# Patient Record
Sex: FEMALE | Race: WHITE | NOT HISPANIC OR LATINO | Employment: FULL TIME | URBAN - NONMETROPOLITAN AREA
[De-identification: names, ages, dates, MRNs, and addresses within clinical notes are randomized per-mention and may not be internally consistent; named-entity substitution may affect disease eponyms.]

---

## 2017-10-12 ENCOUNTER — TRANSCRIBE ORDERS (OUTPATIENT)
Dept: GENERAL RADIOLOGY | Facility: CLINIC | Age: 47
End: 2017-10-12

## 2017-10-12 DIAGNOSIS — Z12.39 SCREENING BREAST EXAMINATION: Primary | ICD-10-CM

## 2019-09-16 ENCOUNTER — TELEPHONE (OUTPATIENT)
Dept: ORTHOPEDIC SURGERY | Facility: CLINIC | Age: 49
End: 2019-09-16

## 2019-09-16 NOTE — TELEPHONE ENCOUNTER
PATIENT INJURED TOE YESTERDAY AND IS WORRIED ABOUT INFECTION. A CABLE WAS WRAPPED AROUND HER TOE AND HAS RIPPED THE NAIL OFF. SHE HAS HAD BOTH KNEES REPLACED WITHIN THE LAST THREE YEARS AND WANTED TO KNOW IF SHE NEEDED AN ANTIBIOTIC WITH THIS INJURY. PATIENT REQUESTED THAT SOMEONE SPEAK TO HER BEFORE CALLING IN A PRESCRIPTION. SHE CAN BE REACHED [phone].

## 2019-09-16 NOTE — TELEPHONE ENCOUNTER
I spoke with the patient and advised that she would need to speak with her PCP to be evaluated for the injury. She understood.    Gissel

## 2019-12-05 ENCOUNTER — OFFICE VISIT (OUTPATIENT)
Dept: FAMILY MEDICINE CLINIC | Facility: CLINIC | Age: 49
End: 2019-12-05

## 2019-12-05 ENCOUNTER — LAB (OUTPATIENT)
Dept: LAB | Facility: OTHER | Age: 49
End: 2019-12-05

## 2019-12-05 VITALS
HEART RATE: 69 BPM | TEMPERATURE: 98.9 F | HEIGHT: 65 IN | WEIGHT: 254.3 LBS | BODY MASS INDEX: 42.37 KG/M2 | SYSTOLIC BLOOD PRESSURE: 146 MMHG | DIASTOLIC BLOOD PRESSURE: 88 MMHG | RESPIRATION RATE: 16 BRPM | OXYGEN SATURATION: 99 %

## 2019-12-05 DIAGNOSIS — Z13.0 SCREENING, ANEMIA, DEFICIENCY, IRON: ICD-10-CM

## 2019-12-05 DIAGNOSIS — N76.0 BACTERIAL VAGINOSIS: Primary | ICD-10-CM

## 2019-12-05 DIAGNOSIS — N89.8 VAGINAL ITCHING: ICD-10-CM

## 2019-12-05 DIAGNOSIS — R60.9 PERIPHERAL EDEMA: ICD-10-CM

## 2019-12-05 DIAGNOSIS — I10 ESSENTIAL HYPERTENSION: ICD-10-CM

## 2019-12-05 DIAGNOSIS — B96.89 BACTERIAL VAGINOSIS: Primary | ICD-10-CM

## 2019-12-05 DIAGNOSIS — Z13.220 SCREENING, LIPID: ICD-10-CM

## 2019-12-05 DIAGNOSIS — G47.00 INSOMNIA, UNSPECIFIED TYPE: ICD-10-CM

## 2019-12-05 DIAGNOSIS — Z98.84 BARIATRIC SURGERY STATUS: ICD-10-CM

## 2019-12-05 DIAGNOSIS — Z23 NEED FOR INFLUENZA VACCINATION: ICD-10-CM

## 2019-12-05 DIAGNOSIS — N92.0 EXCESSIVE AND FREQUENT MENSTRUATION: ICD-10-CM

## 2019-12-05 DIAGNOSIS — Z13.1 SCREENING FOR DIABETES MELLITUS: ICD-10-CM

## 2019-12-05 LAB
ALBUMIN SERPL-MCNC: 4.3 G/DL (ref 3.5–5)
ALBUMIN/GLOB SERPL: 1.2 G/DL (ref 1.1–1.8)
ALP SERPL-CCNC: 76 U/L (ref 38–126)
ALT SERPL W P-5'-P-CCNC: 29 U/L
ANION GAP SERPL CALCULATED.3IONS-SCNC: 8 MMOL/L (ref 5–15)
AST SERPL-CCNC: 24 U/L (ref 14–36)
BACTERIA UR QL AUTO: ABNORMAL /HPF
BASOPHILS # BLD AUTO: 0.03 10*3/MM3 (ref 0–0.2)
BASOPHILS NFR BLD AUTO: 0.3 % (ref 0–1.5)
BILIRUB SERPL-MCNC: 0.3 MG/DL (ref 0.2–1.3)
BILIRUB UR QL STRIP: NEGATIVE
BUN BLD-MCNC: 20 MG/DL (ref 7–23)
BUN/CREAT SERPL: 29.4 (ref 7–25)
CALCIUM SPEC-SCNC: 9.7 MG/DL (ref 8.4–10.2)
CHLORIDE SERPL-SCNC: 107 MMOL/L (ref 101–112)
CHOLEST SERPL-MCNC: 243 MG/DL (ref 150–200)
CLARITY UR: CLEAR
CO2 SERPL-SCNC: 28 MMOL/L (ref 22–30)
COLOR UR: YELLOW
CREAT BLD-MCNC: 0.68 MG/DL (ref 0.52–1.04)
DEPRECATED RDW RBC AUTO: 45.7 FL (ref 37–54)
EOSINOPHIL # BLD AUTO: 0.37 10*3/MM3 (ref 0–0.4)
EOSINOPHIL NFR BLD AUTO: 4.2 % (ref 0.3–6.2)
ERYTHROCYTE [DISTWIDTH] IN BLOOD BY AUTOMATED COUNT: 13.6 % (ref 12.3–15.4)
GFR SERPL CREATININE-BSD FRML MDRD: 92 ML/MIN/1.73 (ref 58–135)
GLOBULIN UR ELPH-MCNC: 3.6 GM/DL (ref 2.3–3.5)
GLUCOSE BLD-MCNC: 82 MG/DL (ref 70–99)
GLUCOSE UR STRIP-MCNC: NEGATIVE MG/DL
HCT VFR BLD AUTO: 40.1 % (ref 34–46.6)
HDLC SERPL-MCNC: 59 MG/DL (ref 40–59)
HGB BLD-MCNC: 12.8 G/DL (ref 12–15.9)
HGB UR QL STRIP.AUTO: ABNORMAL
HYALINE CASTS UR QL AUTO: ABNORMAL /LPF
KETONES UR QL STRIP: NEGATIVE
LDLC SERPL CALC-MCNC: 163 MG/DL
LDLC/HDLC SERPL: 2.76 {RATIO} (ref 0–3.22)
LEUKOCYTE ESTERASE UR QL STRIP.AUTO: NEGATIVE
LYMPHOCYTES # BLD AUTO: 2.26 10*3/MM3 (ref 0.7–3.1)
LYMPHOCYTES NFR BLD AUTO: 25.9 % (ref 19.6–45.3)
MCH RBC QN AUTO: 30 PG (ref 26.6–33)
MCHC RBC AUTO-ENTMCNC: 31.9 G/DL (ref 31.5–35.7)
MCV RBC AUTO: 94.1 FL (ref 79–97)
MONOCYTES # BLD AUTO: 0.73 10*3/MM3 (ref 0.1–0.9)
MONOCYTES NFR BLD AUTO: 8.4 % (ref 5–12)
NEUTROPHILS # BLD AUTO: 5.34 10*3/MM3 (ref 1.7–7)
NEUTROPHILS NFR BLD AUTO: 61.2 % (ref 42.7–76)
NITRITE UR QL STRIP: NEGATIVE
PH UR STRIP.AUTO: 5.5 [PH] (ref 5.5–8)
PLATELET # BLD AUTO: 273 10*3/MM3 (ref 140–450)
PMV BLD AUTO: 11.4 FL (ref 6–12)
POTASSIUM BLD-SCNC: 3.8 MMOL/L (ref 3.4–5)
PROT SERPL-MCNC: 7.9 G/DL (ref 6.3–8.6)
PROT UR QL STRIP: NEGATIVE
RBC # BLD AUTO: 4.26 10*6/MM3 (ref 3.77–5.28)
RBC # UR: ABNORMAL /HPF
REF LAB TEST METHOD: ABNORMAL
SODIUM BLD-SCNC: 143 MMOL/L (ref 137–145)
SP GR UR STRIP: >=1.03 (ref 1–1.03)
SQUAMOUS #/AREA URNS HPF: ABNORMAL /HPF
TRIGL SERPL-MCNC: 107 MG/DL
UROBILINOGEN UR QL STRIP: ABNORMAL
VLDLC SERPL-MCNC: 21.4 MG/DL
WBC NRBC COR # BLD: 8.73 10*3/MM3 (ref 3.4–10.8)
WBC UR QL AUTO: ABNORMAL /HPF

## 2019-12-05 PROCEDURE — 82728 ASSAY OF FERRITIN: CPT | Performed by: NURSE PRACTITIONER

## 2019-12-05 PROCEDURE — 87086 URINE CULTURE/COLONY COUNT: CPT | Performed by: NURSE PRACTITIONER

## 2019-12-05 PROCEDURE — 84439 ASSAY OF FREE THYROXINE: CPT | Performed by: NURSE PRACTITIONER

## 2019-12-05 PROCEDURE — 82306 VITAMIN D 25 HYDROXY: CPT | Performed by: NURSE PRACTITIONER

## 2019-12-05 PROCEDURE — 84466 ASSAY OF TRANSFERRIN: CPT | Performed by: NURSE PRACTITIONER

## 2019-12-05 PROCEDURE — 84480 ASSAY TRIIODOTHYRONINE (T3): CPT | Performed by: NURSE PRACTITIONER

## 2019-12-05 PROCEDURE — 99214 OFFICE O/P EST MOD 30 MIN: CPT | Performed by: NURSE PRACTITIONER

## 2019-12-05 PROCEDURE — 90471 IMMUNIZATION ADMIN: CPT | Performed by: NURSE PRACTITIONER

## 2019-12-05 PROCEDURE — 80053 COMPREHEN METABOLIC PANEL: CPT | Performed by: NURSE PRACTITIONER

## 2019-12-05 PROCEDURE — 36415 COLL VENOUS BLD VENIPUNCTURE: CPT | Performed by: NURSE PRACTITIONER

## 2019-12-05 PROCEDURE — 85025 COMPLETE CBC W/AUTO DIFF WBC: CPT | Performed by: NURSE PRACTITIONER

## 2019-12-05 PROCEDURE — 90674 CCIIV4 VAC NO PRSV 0.5 ML IM: CPT | Performed by: NURSE PRACTITIONER

## 2019-12-05 PROCEDURE — 84443 ASSAY THYROID STIM HORMONE: CPT | Performed by: NURSE PRACTITIONER

## 2019-12-05 PROCEDURE — 83036 HEMOGLOBIN GLYCOSYLATED A1C: CPT | Performed by: NURSE PRACTITIONER

## 2019-12-05 PROCEDURE — 83527 ASSAY OF INSULIN: CPT | Performed by: NURSE PRACTITIONER

## 2019-12-05 PROCEDURE — 81001 URINALYSIS AUTO W/SCOPE: CPT | Performed by: NURSE PRACTITIONER

## 2019-12-05 PROCEDURE — 83540 ASSAY OF IRON: CPT | Performed by: NURSE PRACTITIONER

## 2019-12-05 PROCEDURE — 83525 ASSAY OF INSULIN: CPT | Performed by: NURSE PRACTITIONER

## 2019-12-05 PROCEDURE — 80061 LIPID PANEL: CPT | Performed by: NURSE PRACTITIONER

## 2019-12-05 PROCEDURE — 82607 VITAMIN B-12: CPT | Performed by: NURSE PRACTITIONER

## 2019-12-05 PROCEDURE — 82746 ASSAY OF FOLIC ACID SERUM: CPT | Performed by: NURSE PRACTITIONER

## 2019-12-05 RX ORDER — HYDROCHLOROTHIAZIDE 25 MG/1
25 TABLET ORAL DAILY
Qty: 90 TABLET | Refills: 3 | Status: SHIPPED | OUTPATIENT
Start: 2019-12-05 | End: 2020-12-22

## 2019-12-05 RX ORDER — METRONIDAZOLE 500 MG/1
500 TABLET ORAL 3 TIMES DAILY
Qty: 21 TABLET | Refills: 0 | Status: SHIPPED | OUTPATIENT
Start: 2019-12-05 | End: 2019-12-12

## 2019-12-05 RX ORDER — DOXEPIN HYDROCHLORIDE 100 MG/1
100 CAPSULE ORAL NIGHTLY
Qty: 30 CAPSULE | Refills: 3 | Status: SHIPPED | OUTPATIENT
Start: 2019-12-05 | End: 2019-12-09 | Stop reason: DRUGHIGH

## 2019-12-05 RX ORDER — LISINOPRIL 10 MG/1
10 TABLET ORAL DAILY
Qty: 30 TABLET | Refills: 5 | Status: SHIPPED | OUTPATIENT
Start: 2019-12-05 | End: 2019-12-13

## 2019-12-05 NOTE — PROGRESS NOTES
Chief Complaint   Patient presents with   • Establish Care     high bp   • Insomnia   • Vaginal Itching     Subjective   Fidelina Dailey is a 48 y.o. female who presents to the office for hypertension, insomnia, and vaginal itching. Desires to establish care, previous patient of EDITH Gupta, was referred here by her family members who are patients in our office.     The following portions of the patient's history were reviewed and updated as appropriate: allergies, current medications, past family history, past medical history, past social history, past surgical history and problem list.    History of Present Illness   Insomnia: difficulty staying asleep. daysleeper due to night shift work. Reports needs about 8 hours to wake feeling rested and only is able to get about 5 hours per day since going on night shift in July.   Failed OTC sleep aids, otherwise treatment naive.    Swelling BLE x 3 months or so. Left lower leg much more swollen than right. Is not painful. No history of trauma or surgery of legs.    HTN: reports at least for 6 months has been told when she was in ER or at an office visit that she had elevated blood pressure.   Denies history of heart attack or stroke.   Reports no known family history of early ischemic heart disease.   Treatment naive for HTN.     Hasnt had any labs drawn for over 6 months, not aware of any abnormals except cholesterol, which was treated with statin, but stopped due to lack of follow up due to work and failure to schedule.    Gastric sleeve surgery status as of 3 years ago. Calcium supplementation was advised by Dr weeks along with vitamin D, B12 and multivitamin. Has never been advised of any vitamin deficiency. She reports compliance with OTC vitamin therapy.     Vaginal itch onset several months ago. without noted discharge, denies dyspareunia, no urinary frequency, urgency or dysuria.   Has tried OTC monistat repeatedly with refractory recurrence of  symptom.    Menstrual abnormality: previously experience less frequent periods than monthly and was placed on hormonal therapy to fix it. After she stopped taking this, she has subsequently began to have more frequent periods than monthly, sometimes and most of the time, actually, having 2 menstrual cycles per month.   Discussed BV as source of her recurrent vaginal itch versus condition caused by hormonal imbalance or personal development of diabetes as possible causes.  Agreeable to reschedule with Dr Rodríguez, her OB/GYN physician to discuss this.  Agreeable to a trial of metronidazole and a urinalysis/ culture to rule out silent UTI as a cause as well.   Will also check for anemia/ iron def due to excessive menstrual bleeding.           Past Medical History:   Diagnosis Date   • Hyperlipidemia    • Hypertension           Family History   Problem Relation Age of Onset   • Hypertension Mother    • Cancer Mother    • No Known Problems Father         Review of Systems   Constitutional: Positive for fatigue (chronically).   HENT: Negative.    Eyes: Negative.    Respiratory: Negative.    Cardiovascular: Positive for leg swelling (LLE chronic). Negative for chest pain and palpitations.        HTN for months.   Gastrointestinal: Positive for diarrhea.        Some diarrhea dependent on diet since gallbladder removed in 2018   Endocrine: Negative.    Genitourinary: Negative for decreased urine volume, dysuria, frequency, pelvic pain, urgency, vaginal bleeding, vaginal discharge (itchy, no discharge) and vaginal pain.   Allergic/Immunologic: Negative.    Neurological: Negative.    Hematological: Negative.    Psychiatric/Behavioral: Positive for sleep disturbance. Negative for suicidal ideas.        Daysleeper due to night shift work. Difficulty staying asleep, no trouble falling asleep.   All other systems reviewed and are negative.      Objective   Vitals:    12/05/19 0940   BP: 146/88   BP Location: Left arm   Patient  "Position: Sitting   Cuff Size: Adult   Pulse: 69   Resp: 16   Temp: 98.9 °F (37.2 °C)   TempSrc: Tympanic   SpO2: 99%   Weight: 115 kg (254 lb 4.8 oz)   Height: 165.1 cm (65\")   PainSc: 0-No pain     Physical Exam   Constitutional: She is oriented to person, place, and time. She appears well-developed and well-nourished.   obesity   HENT:   Head: Normocephalic and atraumatic.   Eyes: Conjunctivae are normal. Pupils are equal, round, and reactive to light.   Neck: Normal range of motion. Neck supple. No JVD present. No tracheal deviation present. No thyromegaly present.   Cardiovascular: Normal rate, regular rhythm, normal heart sounds and intact distal pulses. Exam reveals no gallop and no friction rub.   No murmur heard.  Pulmonary/Chest: Effort normal and breath sounds normal. No respiratory distress. She has no wheezes. She has no rales. She exhibits no tenderness.   Abdominal: Soft. Bowel sounds are normal. She exhibits no distension and no mass. There is no tenderness. There is no rebound and no guarding. No hernia.   Musculoskeletal: Normal range of motion. She exhibits no edema, tenderness or deformity.   Lymphadenopathy:     She has no cervical adenopathy.   Neurological: She is alert and oriented to person, place, and time. No cranial nerve deficit.   Skin: Skin is warm and dry. Capillary refill takes 2 to 3 seconds. No erythema. No pallor.   Psychiatric: She has a normal mood and affect. Her behavior is normal. Judgment and thought content normal.   Nursing note and vitals reviewed.      Assessment/Plan   Fidelina was seen today for establish care, insomnia and vaginal itching.    Diagnoses and all orders for this visit:    Bacterial vaginosis  -     metroNIDAZOLE (FLAGYL) 500 MG tablet; Take 1 tablet by mouth 3 (Three) Times a Day for 7 days.    Insomnia, unspecified type  -     doxepin (SINEquan) 100 MG capsule; Take 1 capsule by mouth Every Night.    Peripheral edema  -     hydroCHLOROthiazide " (HYDRODIURIL) 25 MG tablet; Take 1 tablet by mouth Daily.    Essential hypertension  -     lisinopril (PRINIVIL,ZESTRIL) 10 MG tablet; Take 1 tablet by mouth Daily.  -     CBC & Differential  -     Comprehensive Metabolic Panel  -     T4, Free  -     TSH  -     T3    Screening, lipid  -     Lipid Panel    Screening for diabetes mellitus  -     Comprehensive Metabolic Panel  -     Hemoglobin A1c  -     Insulin, Free & Total, Serum    Screening, anemia, deficiency, iron  -     CBC & Differential  -     Ferritin  -     Iron Profile    Bariatric surgery status  -     Comprehensive Metabolic Panel  -     Vitamin B12 & Folate  -     Vitamin D 25 Hydroxy    Vaginal itching  -     Urinalysis With Microscopic - Urine, Clean Catch; Future  -     Urine Culture - Urine, Urine, Clean Catch; Future  -     Ambulatory Referral to Obstetrics / Gynecology    Excessive and frequent menstruation  -     Ambulatory Referral to Obstetrics / Gynecology           PHQ-2/PHQ-9 Depression Screening 12/5/2019   Little interest or pleasure in doing things 0   Feeling down, depressed, or hopeless 0   Trouble falling or staying asleep, or sleeping too much 3   Feeling tired or having little energy 3   Poor appetite or overeating 0   Feeling bad about yourself - or that you are a failure or have let yourself or your family down 0   Trouble concentrating on things, such as reading the newspaper or watching television 0   Moving or speaking so slowly that other people could have noticed. Or the opposite - being so fidgety or restless that you have been moving around a lot more than usual 0   Thoughts that you would be better off dead, or of hurting yourself in some way 0   Total Score 6       EDITH Graham         Return in about 1 week (around 12/12/2019).    Patient Instructions   Have labs done today.

## 2019-12-06 LAB
25(OH)D3 SERPL-MCNC: 41.1 NG/ML (ref 30–100)
BACTERIA SPEC AEROBE CULT: NORMAL
FERRITIN SERPL-MCNC: 53.1 NG/ML (ref 13–150)
FOLATE SERPL-MCNC: 16.8 NG/ML (ref 4.78–24.2)
HBA1C MFR BLD: 5.1 % (ref 4.8–5.6)
IRON 24H UR-MRATE: 41 MCG/DL (ref 37–145)
IRON SATN MFR SERPL: 10 % (ref 20–50)
T3 SERPL-MCNC: 108 NG/DL (ref 80–200)
T4 FREE SERPL-MCNC: 1.18 NG/DL (ref 0.93–1.7)
TIBC SERPL-MCNC: 402 MCG/DL (ref 298–536)
TRANSFERRIN SERPL-MCNC: 270 MG/DL (ref 200–360)
TSH SERPL DL<=0.05 MIU/L-ACNC: 2.82 UIU/ML (ref 0.27–4.2)
VIT B12 BLD-MCNC: 1122 PG/ML (ref 211–946)

## 2019-12-09 DIAGNOSIS — G47.00 INSOMNIA, UNSPECIFIED TYPE: Primary | ICD-10-CM

## 2019-12-09 RX ORDER — DOXEPIN HYDROCHLORIDE 25 MG/1
CAPSULE ORAL
Qty: 60 CAPSULE | Refills: 5 | Status: SHIPPED | OUTPATIENT
Start: 2019-12-09 | End: 2019-12-13

## 2019-12-11 LAB
INSULIN FREE SERPL-ACNC: 6.9 UU/ML
INSULIN SERPL-ACNC: 6.9 UU/ML

## 2019-12-13 ENCOUNTER — LAB (OUTPATIENT)
Dept: LAB | Facility: OTHER | Age: 49
End: 2019-12-13

## 2019-12-13 ENCOUNTER — OFFICE VISIT (OUTPATIENT)
Dept: FAMILY MEDICINE CLINIC | Facility: CLINIC | Age: 49
End: 2019-12-13

## 2019-12-13 VITALS
RESPIRATION RATE: 16 BRPM | OXYGEN SATURATION: 99 % | HEIGHT: 65 IN | DIASTOLIC BLOOD PRESSURE: 87 MMHG | TEMPERATURE: 98.9 F | HEART RATE: 71 BPM | WEIGHT: 248.7 LBS | BODY MASS INDEX: 41.44 KG/M2 | SYSTOLIC BLOOD PRESSURE: 144 MMHG

## 2019-12-13 DIAGNOSIS — Z13.1 SCREENING FOR DIABETES MELLITUS: ICD-10-CM

## 2019-12-13 DIAGNOSIS — E78.2 MIXED HYPERLIPIDEMIA: Primary | Chronic | ICD-10-CM

## 2019-12-13 DIAGNOSIS — Z98.84 BARIATRIC SURGERY STATUS: ICD-10-CM

## 2019-12-13 DIAGNOSIS — E66.9 ABDOMINAL OBESITY AND METABOLIC SYNDROME: ICD-10-CM

## 2019-12-13 DIAGNOSIS — E88.81 ABDOMINAL OBESITY AND METABOLIC SYNDROME: ICD-10-CM

## 2019-12-13 DIAGNOSIS — I10 ESSENTIAL HYPERTENSION: Chronic | ICD-10-CM

## 2019-12-13 DIAGNOSIS — G47.00 INSOMNIA, UNSPECIFIED TYPE: ICD-10-CM

## 2019-12-13 DIAGNOSIS — Z79.899 LONG-TERM USE OF HIGH-RISK MEDICATION: ICD-10-CM

## 2019-12-13 DIAGNOSIS — Z13.220 SCREENING, LIPID: ICD-10-CM

## 2019-12-13 DIAGNOSIS — R60.9 PERIPHERAL EDEMA: ICD-10-CM

## 2019-12-13 PROBLEM — E78.5 HYPERLIPIDEMIA: Chronic | Status: ACTIVE | Noted: 2019-12-13

## 2019-12-13 PROCEDURE — G0481 DRUG TEST DEF 8-14 CLASSES: HCPCS | Performed by: NURSE PRACTITIONER

## 2019-12-13 PROCEDURE — 99213 OFFICE O/P EST LOW 20 MIN: CPT | Performed by: NURSE PRACTITIONER

## 2019-12-13 PROCEDURE — 80307 DRUG TEST PRSMV CHEM ANLYZR: CPT | Performed by: NURSE PRACTITIONER

## 2019-12-13 RX ORDER — PRAVASTATIN SODIUM 20 MG
20 TABLET ORAL DAILY
Qty: 30 TABLET | Refills: 5 | Status: SHIPPED | OUTPATIENT
Start: 2019-12-13 | End: 2020-03-09 | Stop reason: DRUGHIGH

## 2019-12-13 RX ORDER — PRAVASTATIN SODIUM 10 MG
10 TABLET ORAL NIGHTLY
Qty: 30 TABLET | Refills: 5 | Status: CANCELLED | OUTPATIENT
Start: 2019-12-13

## 2019-12-13 RX ORDER — LISINOPRIL 20 MG/1
20 TABLET ORAL DAILY
Qty: 30 TABLET | Refills: 5 | Status: SHIPPED | OUTPATIENT
Start: 2019-12-13 | End: 2020-05-05

## 2019-12-13 RX ORDER — ZALEPLON 10 MG/1
10 CAPSULE ORAL NIGHTLY
Qty: 30 CAPSULE | Refills: 0 | Status: SHIPPED | OUTPATIENT
Start: 2019-12-13 | End: 2019-12-26 | Stop reason: SDUPTHER

## 2019-12-13 NOTE — PATIENT INSTRUCTIONS
"Remember to take your medications in \"your morning\" so before you go to work   Take your cholesterol medication with your sleeping pill before you go to sleep(:     Follow up appointment in 3 months, get lab work done a few days before you arrive      "

## 2019-12-13 NOTE — PROGRESS NOTES
Chief Complaint   Patient presents with   • Hypertension     1 week f/u   • Anxiety     Subjective   Fidelina Dailey is a 48 y.o. female who presents to the office for routine follow up of chronic illnesses and review of lab work after establishing care last week.    The following portions of the patient's history were reviewed and updated as appropriate: allergies, current medications, past family history, past medical history, past social history, past surgical history and problem list.    History of Present Illness   Fasting labs: 12/5/19  CBC: WNL  CMP: WNL, BUN/Creat ratio 29.4  Lipid: ,   Thyroid: WNL  A1c: 5.1%  B12: WNL, >1000  Vitamin D: WNL  Insulin, free: 6.9    Insomnia: Reports she is unable to take the Doxepin because it makes her too sleepy. She reports that she slept over 12 hours, then after getting up, she went back to sleep several times. She reports having tired her SO's sonata with great benefit and no drowsiness.      Swelling BLE : Started HCTZ one week ago and reports that she has seen little improvement. Left leg remains worse than right with 4+ pitting edema. She is also taking this medication when she gets home from work in the mornings before going to sleep.      HTN: Started on lisinopril last week for HTN BP was 157/98. Today she is 144/87.  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 She is advised to take the medication before going to work since that is her morning.      Vaginal itch: She reports that she is still itching after flagyl prescribed last week. The odor has improved as well but neither are gone. She had an appointment this morning with Dr. Escoto but had to cancel due to work. She plans to call and reschedule for sometime this week.      Hyperlipidemia: Has previously had high cholesterol in the past and took pravastatin in the past but was unable to follow up with the doctor  "so she stopped the medication. Restarted on medication at this time and educated on the importance of follow up and continuation of care.     Portions of HPI, ROS  and PE from most recent  Visit carried forward and updated as appropriate for current situation.  Past Medical History:   Diagnosis Date   • Hyperlipidemia    • Hypertension           Family History   Problem Relation Age of Onset   • Hypertension Mother    • Cancer Mother    • No Known Problems Father         Review of Systems   Constitutional: Positive for activity change and fatigue (chronically).   HENT: Negative.    Eyes: Negative.    Respiratory: Negative.    Cardiovascular: Positive for leg swelling (LLE chronic ). Negative for chest pain and palpitations.        HTN for months.   Gastrointestinal: Positive for diarrhea.        Chronic due gastric surgery     Endocrine: Negative.    Genitourinary: Negative for decreased urine volume, dysuria, frequency, pelvic pain, urgency, vaginal bleeding, vaginal discharge (itchy, no discharge) and vaginal pain.   Allergic/Immunologic: Negative.    Neurological: Negative.    Hematological: Negative.    Psychiatric/Behavioral: Positive for sleep disturbance. Negative for confusion and suicidal ideas. The patient is not nervous/anxious.         Daysleeper due to night shift work. Difficulty staying asleep, no trouble falling asleep.   All other systems reviewed and are negative.      Objective   Vitals:    12/13/19 1005   BP: 144/87   BP Location: Left arm   Patient Position: Sitting   Cuff Size: Adult   Pulse: 71   Resp: 16   Temp: 98.9 °F (37.2 °C)   TempSrc: Tympanic   SpO2: 99%   Weight: 113 kg (248 lb 11.2 oz)   Height: 165.1 cm (65\")   PainSc: 0-No pain     Physical Exam   Constitutional: She is oriented to person, place, and time. She appears well-developed and well-nourished.   obesity   HENT:   Head: Normocephalic and atraumatic.   Eyes: Pupils are equal, round, and reactive to light. Conjunctivae are " normal.   Neck: Normal range of motion. Neck supple. No JVD present. No tracheal deviation present. No thyromegaly present.   Cardiovascular: Normal rate, regular rhythm, normal heart sounds and intact distal pulses. Exam reveals no gallop and no friction rub.   No murmur heard.  Pulmonary/Chest: Effort normal and breath sounds normal. No respiratory distress. She has no wheezes. She has no rales. She exhibits no tenderness.   Abdominal: Soft. Bowel sounds are normal. She exhibits no distension and no mass. There is no tenderness. There is no rebound and no guarding. No hernia.   Musculoskeletal: Normal range of motion. She exhibits no edema, tenderness or deformity.   Lymphadenopathy:     She has no cervical adenopathy.   Neurological: She is alert and oriented to person, place, and time. No cranial nerve deficit.   Skin: Skin is warm and dry. Capillary refill takes 2 to 3 seconds. No erythema. No pallor.   Psychiatric: She has a normal mood and affect. Her behavior is normal. Judgment and thought content normal.   Nursing note and vitals reviewed.      Assessment/Plan   Fidelina was seen today for hypertension and anxiety.    Diagnoses and all orders for this visit:    Mixed hyperlipidemia  -     pravastatin (PRAVACHOL) 20 MG tablet; Take 1 tablet by mouth Daily.  -     Comprehensive Metabolic Panel; Future  -     Lipid Panel; Future    Essential hypertension  -     lisinopril (PRINIVIL,ZESTRIL) 20 MG tablet; Take 1 tablet by mouth Daily.    Peripheral edema  -     Ambulatory Referral to Cardiothoracic Surgery    Screening, lipid    Bariatric surgery status  -     Comprehensive Metabolic Panel; Future  -     Lipid Panel; Future  -     CBC & Differential; Future    Insomnia, unspecified type  -     zaleplon (SONATA) 10 MG capsule; Take 1 capsule by mouth Every Night.  -     CBC & Differential; Future  -     T4, Free; Future  -     TSH; Future  -     T3; Future    Screening for diabetes mellitus  -     Hemoglobin A1c;  "Future    Abdominal obesity and metabolic syndrome  -     Comprehensive Metabolic Panel; Future    Long-term use of high-risk medication  -     ToxASSURE Select 13 Discrete -; Future           PHQ-2/PHQ-9 Depression Screening 12/5/2019   Little interest or pleasure in doing things 0   Feeling down, depressed, or hopeless 0   Trouble falling or staying asleep, or sleeping too much 3   Feeling tired or having little energy 3   Poor appetite or overeating 0   Feeling bad about yourself - or that you are a failure or have let yourself or your family down 0   Trouble concentrating on things, such as reading the newspaper or watching television 0   Moving or speaking so slowly that other people could have noticed. Or the opposite - being so fidgety or restless that you have been moving around a lot more than usual 0   Thoughts that you would be better off dead, or of hurting yourself in some way 0   Total Score 6   Patient understands the risks associated with this controlled medication, including tolerance and addiction.  Patient also agrees to only obtain this medication from me, and not from a another provider, unless that provider is covering for me in my absence.  Patient also agrees to be compliant in dosing, and not self adjust the dose of medication.  A signed controlled substance agreement is on file, and the patient has received a controlled substance education sheet at this a previous visit.  The patient has also signed a consent for treatment with a controlled substance as per [facility] policy. MIL was obtained.      EDITH Graham         Return in about 1 month (around 1/13/2020).    Patient Instructions   Remember to take your medications in \"your morning\" so before you go to work   Take your cholesterol medication with your sleeping pill before you go to sleep(:     Follow up appointment in 3 months, get lab work done a few days before you arrive        "

## 2019-12-20 LAB
6-ACETYLMORPHINE: NEGATIVE
6MAM SERPLBLD-MCNC: NOT DETECTED NG/MG CREAT
7-AMINOCLONAZEPAM UR: NOT DETECTED NG/MG CREAT
A-OH ALPRAZ/CREAT UR: NOT DETECTED NG/MG CREAT
ALFENTANIL UR QL: NOT DETECTED NG/MG CREAT
ALPHA-HYDROXYMIDAZOLAM, URINE: NOT DETECTED NG/MG CREAT
ALPHA-HYDROXYTRIAZOLAM, URINE: NOT DETECTED NG/MG CREAT
AMOBARBITAL UR: NOT DETECTED
AMPHET UR QL CFM: NOT DETECTED NG/MG CREAT
AMPHETAMINES UR QL SCN: NEGATIVE
BARBITAL UR QL CFM: NOT DETECTED
BARBITURATES UR QL SCN: NEGATIVE
BENZODIAZ UR QL SCN: NEGATIVE
BENZOYLECGONINE UR: NOT DETECTED NG/MG CREAT
BUPRENORPHINE UR QL: NEGATIVE
BUPRENORPHINE UR QL: NOT DETECTED NG/MG CREAT
BUTABARBITAL UR QL: NOT DETECTED
BUTALBITAL UR QL: NOT DETECTED
CANNABINOIDS UR QL CFM: NEGATIVE
CLONAZEPAM UR QL: NOT DETECTED NG/MG CREAT
COCAETHYLENE UR QL CFM: NOT DETECTED NG/MG CREAT
COCAINE UR QL CFM: NEGATIVE
CODEINE UR QL: NOT DETECTED NG/MG CREAT
CONV COCAINE, UR: NOT DETECTED NG/MG CREAT
CONV REPORT SUMMARY: NORMAL
CREAT 24H UR-MCNC: 77 MG/DL
DESALKYLFLURAZ/CREAT UR: NOT DETECTED NG/MG CREAT
DESMETHYLFLUNITRAZEPAM: NOT DETECTED NG/MG CREAT
DIAZEPAM UR-MCNC: NOT DETECTED NG/MG CREAT
DIHYDROCODEINE UR: NOT DETECTED NG/MG CREAT
EDDP SERPL QL: NOT DETECTED NG/MG CREAT
ETHANOL SCREEN URINE (REF): NEGATIVE
ETHANOL UR-MCNC: NOT DETECTED G/DL
FENTANYL UR QL: NEGATIVE
FENTANYL+NORFENTANYL UR QL SCN: NOT DETECTED NG/MG CREAT
FLUNITRAZEPAM SERPLBLD-MCNC: NOT DETECTED NG/MG CREAT
HYDROCODONE UR QL: NOT DETECTED NG/MG CREAT
HYDROMORPHONE UR QL: NOT DETECTED NG/MG CREAT
LEVEL OF DETECTION:: NORMAL
LORAZEPAM UR-MCNC: NOT DETECTED NG/MG CREAT
LORAZEPAM/CREAT UR: NOT DETECTED NG/MG CREAT
MDA SERPLBLD-MCNC: NOT DETECTED NG/MG CREAT
MDMA UR QL SCN: NOT DETECTED NG/MG CREAT
MEPHOBARBITAL UR QL CFM: NOT DETECTED
METHADONE BLD QL SCN: NEGATIVE
METHADONE, URINE: NOT DETECTED NG/MG CREAT
METHAMPHETAMINE UR: NOT DETECTED NG/MG CREAT
MIDAZOLAM UR-MCNC: NOT DETECTED NG/MG CREAT
MORPHINE UR QL: NOT DETECTED NG/MG CREAT
N-DESMETHYLTRAMADOL, U: NOT DETECTED NG/MG CREAT
NARCOTICS UR: NEGATIVE
NORBUPRENORPHINE SERPLBLD-MCNC: NOT DETECTED NG/MG CREAT
NORCODEINE UR-MCNC: NOT DETECTED NG/MG CREAT
NORDIAZEPAM SERPL CFM-MCNC: NOT DETECTED NG/MG CREAT
NORFENTANYL UR: NOT DETECTED NG/MG CREAT
NORMORPHINE: NOT DETECTED NG/MG CREAT
NOROXYMORPHONE: NOT DETECTED NG/MG CREAT
O-DESMETHYLTRAMADOL, UR: NOT DETECTED NG/MG CREAT
OPIATES UR QL CFM: NEGATIVE
OPIATES, URINE, NORHYDROCODONE: NOT DETECTED NG/MG CREAT
OPIATES, URINE, NOROXYCODONE: NOT DETECTED NG/MG CREAT
OXAZEPAM UR QL: NOT DETECTED NG/MG CREAT
OXYCODONE UR QL: NEGATIVE
OXYCODONE UR: NOT DETECTED NG/MG CREAT
OXYMORPHONE UR: NOT DETECTED NG/MG CREAT
PENTOBARBITAL UR: NOT DETECTED
PHENOBARB UR QL: NOT DETECTED
SECOBARBITAL UR QL: NOT DETECTED
SUFENTANIL UR QL: NOT DETECTED NG/MG CREAT
TAPENTADOL SERPLBLD-MCNC: NEGATIVE NG/ML
TAPENTADOL UR-MCNC: NOT DETECTED NG/MG CREAT
TEMAZEPAM UR QL CFM: NOT DETECTED NG/MG CREAT
THC UR CFM-MCNC: NOT DETECTED NG/MG CREAT
THIOPENTAL UR QL CFM: NOT DETECTED
TRAMADOL UR: NOT DETECTED NG/MG CREAT

## 2019-12-26 ENCOUNTER — OFFICE VISIT (OUTPATIENT)
Dept: FAMILY MEDICINE CLINIC | Facility: CLINIC | Age: 49
End: 2019-12-26

## 2019-12-26 VITALS
HEIGHT: 65 IN | HEART RATE: 82 BPM | TEMPERATURE: 97.1 F | BODY MASS INDEX: 40.24 KG/M2 | RESPIRATION RATE: 16 BRPM | DIASTOLIC BLOOD PRESSURE: 80 MMHG | OXYGEN SATURATION: 98 % | WEIGHT: 241.5 LBS | SYSTOLIC BLOOD PRESSURE: 132 MMHG

## 2019-12-26 DIAGNOSIS — G47.00 INSOMNIA, UNSPECIFIED TYPE: ICD-10-CM

## 2019-12-26 DIAGNOSIS — N76.0 BACTERIAL VAGINOSIS: ICD-10-CM

## 2019-12-26 DIAGNOSIS — E61.1 IRON DEFICIENCY: Primary | ICD-10-CM

## 2019-12-26 DIAGNOSIS — I10 ESSENTIAL HYPERTENSION: Chronic | ICD-10-CM

## 2019-12-26 DIAGNOSIS — B96.89 BACTERIAL VAGINOSIS: ICD-10-CM

## 2019-12-26 PROCEDURE — 99214 OFFICE O/P EST MOD 30 MIN: CPT | Performed by: NURSE PRACTITIONER

## 2019-12-26 RX ORDER — ZALEPLON 10 MG/1
10 CAPSULE ORAL NIGHTLY
Qty: 30 CAPSULE | Refills: 0 | Status: SHIPPED | OUTPATIENT
Start: 2019-12-26 | End: 2020-01-23 | Stop reason: ALTCHOICE

## 2019-12-26 RX ORDER — METRONIDAZOLE 500 MG/1
500 TABLET ORAL 3 TIMES DAILY
Qty: 30 TABLET | Refills: 0 | Status: SHIPPED | OUTPATIENT
Start: 2019-12-26 | End: 2020-01-05

## 2019-12-26 RX ORDER — LANOLIN ALCOHOL/MO/W.PET/CERES
325 CREAM (GRAM) TOPICAL
Qty: 90 TABLET | Refills: 0 | Status: SHIPPED | OUTPATIENT
Start: 2019-12-26 | End: 2020-06-18

## 2019-12-26 RX ORDER — CLOTRIMAZOLE 1 %
CREAM (GRAM) TOPICAL 2 TIMES DAILY
Qty: 60 G | Refills: 0 | Status: SHIPPED | OUTPATIENT
Start: 2019-12-26 | End: 2020-06-18

## 2019-12-26 NOTE — PROGRESS NOTES
Chief Complaint   Patient presents with   • Lab Results     Subjective   Fidelina Dailey is a 48 y.o. female who presents to the office for routine follow up of chronic illnesses. She is also here to discuss lab results    The following portions of the patient's history were reviewed and updated as appropriate: allergies, current medications, past family history, past medical history, past social history, past surgical history and problem list.    History of Present Illness   UDS: 12/13/19 initial UDS appropriate    Fasting labs: 12/5/19  CBC: WNL  CMP: WNL, BUN/Creat ratio 29.4  Lipid: ,   Thyroid: WNL  A1c: 5.1%  B12: WNL, >1000  Vitamin D: WNL  Insulin, free: 6.9    Iron Deficiency: Her recent labs show iron stores low; patent reports difficulty sleeping, fatigue, and anxiety. Her CBC was WNL. Will treat with iron replacement.     Insomnia: Started on Sonata 10mg nightly at last visit. She reports that she has not started taking this medication. The pharmacy reportedly sent a PA to our office, however, there is not one available. I will resend the Rx today and the patient is aware of the transaction and will alert the office if they need to send another PA when she gets to the pharmacy.     HTN: BP well controlled today at 132/80. She reports she is now taking her BP medications before work and feels like it is better controlled since she is taking it before work.     BV: This is a continual problem previously treated with flagyl. She reports that this helped and would like to try it again since she has been unable to see Almita Bates yet.     Portions of ROS  and PE from most recent  Visit carried forward and updated as appropriate for current situation.  Past Medical History:   Diagnosis Date   • Hyperlipidemia    • Hypertension           Family History   Problem Relation Age of Onset   • Hypertension Mother    • Cancer Mother    • No Known Problems Father         Review of Systems  "  Constitutional: Positive for fatigue (chronically). Negative for activity change.   HENT: Negative.    Eyes: Negative.    Respiratory: Negative.    Cardiovascular: Positive for leg swelling (LLE chronic ). Negative for chest pain and palpitations.   Gastrointestinal: Positive for diarrhea. Negative for abdominal distention, abdominal pain, nausea and vomiting.        Chronic due gastric surgery     Endocrine: Negative.    Genitourinary: Negative for decreased urine volume, dysuria, frequency, pelvic pain, urgency, vaginal bleeding, vaginal discharge (itchy, no discharge) and vaginal pain.        Vaginal itching continues   Allergic/Immunologic: Negative.    Neurological: Negative.    Hematological: Negative.    Psychiatric/Behavioral: Positive for sleep disturbance. Negative for confusion and suicidal ideas. The patient is not nervous/anxious.         Daysleeper due to night shift work. Difficulty staying asleep, no trouble falling asleep.   All other systems reviewed and are negative.      Objective   Vitals:    12/26/19 0856   BP: 132/80   BP Location: Left arm   Patient Position: Sitting   Cuff Size: Adult   Pulse: 82   Resp: 16   Temp: 97.1 °F (36.2 °C)   TempSrc: Tympanic   SpO2: 98%   Weight: 110 kg (241 lb 8 oz)   Height: 165.1 cm (65\")     Physical Exam   Constitutional: She is oriented to person, place, and time. She appears well-developed and well-nourished.   obesity   HENT:   Head: Normocephalic and atraumatic.   Eyes: Pupils are equal, round, and reactive to light. Conjunctivae are normal.   Neck: Normal range of motion. Neck supple. No JVD present. No tracheal deviation present. No thyromegaly present.   Cardiovascular: Normal rate, regular rhythm, normal heart sounds and intact distal pulses. Exam reveals no gallop and no friction rub.   No murmur heard.  Pulmonary/Chest: Effort normal and breath sounds normal. No respiratory distress. She has no wheezes. She has no rales. She exhibits no " tenderness.   Abdominal: Soft. Bowel sounds are normal. She exhibits no distension and no mass. There is no tenderness. There is no rebound and no guarding. No hernia.   Musculoskeletal: Normal range of motion. She exhibits no edema, tenderness or deformity.   Lymphadenopathy:     She has no cervical adenopathy.   Neurological: She is alert and oriented to person, place, and time. No cranial nerve deficit.   Skin: Skin is warm and dry. Capillary refill takes 2 to 3 seconds. No erythema. No pallor.   Psychiatric: She has a normal mood and affect. Her behavior is normal. Judgment and thought content normal.   Nursing note and vitals reviewed.      Assessment/Plan   Fidelina was seen today for lab results.    Diagnoses and all orders for this visit:    Iron deficiency  -     ferrous sulfate 325 (65 FE) MG EC tablet; Take 1 tablet by mouth Daily With Breakfast. Return to lab when bottle empty-(for low iron stores)  -     Iron Profile; Future    Insomnia, unspecified type  -     zaleplon (SONATA) 10 MG capsule; Take 1 capsule by mouth Every Night.    Bacterial vaginosis  -     metroNIDAZOLE (FLAGYL) 500 MG tablet; Take 1 tablet by mouth 3 (Three) Times a Day for 10 days.  -     clotrimazole (LOTRIMIN) 1 % cream; Apply  topically to the appropriate area as directed 2 (Two) Times a Day.    Essential hypertension  Comments:  better than prior visit, not at goal still, due to recent changes will reassess in months and address then if not at goal bp today 132/80 hr 82.         Resent order for Sonata to generate the PA needed/ rejection letter and note to pharmacy to forward same to us for processing.   PHQ-2/PHQ-9 Depression Screening 12/5/2019   Little interest or pleasure in doing things 0   Feeling down, depressed, or hopeless 0   Trouble falling or staying asleep, or sleeping too much 3   Feeling tired or having little energy 3   Poor appetite or overeating 0   Feeling bad about yourself - or that you are a failure or have  let yourself or your family down 0   Trouble concentrating on things, such as reading the newspaper or watching television 0   Moving or speaking so slowly that other people could have noticed. Or the opposite - being so fidgety or restless that you have been moving around a lot more than usual 0   Thoughts that you would be better off dead, or of hurting yourself in some way 0   Total Score 6   Patient understands the risks associated with this controlled medication, including tolerance and addiction.  Patient also agrees to only obtain this medication from me, and not from a another provider, unless that provider is covering for me in my absence.  Patient also agrees to be compliant in dosing, and not self adjust the dose of medication.  A signed controlled substance agreement is on file, and the patient has received a controlled substance education sheet at this a previous visit.  The patient has also signed a consent for treatment with a controlled substance as per [facility] policy. MIL was obtained.      EDITH Graham         Return in about 2 months (around 3/6/2020), or if symptoms worsen or fail to improve, for Next scheduled follow up.    Patient Instructions   If you start to become consiptated with the iron supplement, take over the counter Docusate 100mg as needed as a stool softner (small red gel cap).

## 2019-12-26 NOTE — PATIENT INSTRUCTIONS
If you start to become consiptated with the iron supplement, take over the counter Docusate 100mg as needed as a stool softner (small red gel cap).

## 2020-01-03 ENCOUNTER — OFFICE VISIT (OUTPATIENT)
Dept: OBSTETRICS AND GYNECOLOGY | Facility: CLINIC | Age: 50
End: 2020-01-03

## 2020-01-03 VITALS
SYSTOLIC BLOOD PRESSURE: 140 MMHG | BODY MASS INDEX: 40.39 KG/M2 | HEART RATE: 76 BPM | HEIGHT: 65 IN | DIASTOLIC BLOOD PRESSURE: 80 MMHG | WEIGHT: 242.4 LBS

## 2020-01-03 DIAGNOSIS — N76.0 BACTERIAL VAGINOSIS: ICD-10-CM

## 2020-01-03 DIAGNOSIS — L29.2 VULVAR ITCHING: Primary | ICD-10-CM

## 2020-01-03 DIAGNOSIS — B96.89 BACTERIAL VAGINOSIS: ICD-10-CM

## 2020-01-03 PROCEDURE — 87210 SMEAR WET MOUNT SALINE/INK: CPT | Performed by: NURSE PRACTITIONER

## 2020-01-03 PROCEDURE — 99214 OFFICE O/P EST MOD 30 MIN: CPT | Performed by: NURSE PRACTITIONER

## 2020-01-03 NOTE — PROGRESS NOTES
Subjective   Fidelina Dailey is a 49 y.o. female.     History of Present Illness   Pt presents, referred by PCP, for concerns about recurrent BV and vulvar itching. Pt states she has been treated with Flagyl for BV several times in the last few months. Symptoms resolve with use but do return. PCP just gave her Flagyl and pt has almost completed it. She states currently, her symptoms are almost completely resolved. External itching has also improved with clotrimazole cream provided by PCP, but is still mild to moderate. Denies vaginal discharge or burning. No urinary S/S.    She washes with vagisil or liquid dove soaps, uses scented washes and fabric softeners.     The following portions of the patient's history were reviewed and updated as appropriate: allergies, current medications, past family history, past medical history, past social history, past surgical history and problem list.    Review of Systems   Constitutional: Negative.    Respiratory: Negative.    Cardiovascular: Negative.    Genitourinary: Negative for dysuria, genital sores, urgency, vaginal bleeding, vaginal discharge and vaginal pain.        Vulvar itching and vaginal odor       Objective    Vitals:    01/03/20 1409   BP: 140/80   Pulse: 76         01/03/20  1409   Weight: 110 kg (242 lb 6.4 oz)     Body mass index is 40.34 kg/m².    Physical Exam   Constitutional: She is oriented to person, place, and time. She appears well-developed and well-nourished.   Cardiovascular: Normal rate, regular rhythm and normal heart sounds.   Pulmonary/Chest: Effort normal and breath sounds normal.   Genitourinary: Uterus normal and cervix normal.       Right adnexum displays no mass, no tenderness and no fullness. Left adnexum displays no mass, no tenderness and no fullness. No erythema (mild), tenderness or bleeding in the vagina. No foreign body in the vagina. No signs of injury around the vagina. Vaginal discharge (scant, thin cloudy discharge. wet prep  obtained) found.   Genitourinary Comments: Wet prep: Negative for clue cells, yeast buds, hyphae or trich. Evaluated by MARY Stephen.    Neurological: She is alert and oriented to person, place, and time.   Vitals reviewed.        Assessment/Plan   Fidelina was seen today for vaginitis.    Diagnoses and all orders for this visit:    Vulvar itching  -     triamcinolone (KENALOG) 0.1 % ointment; Apply  topically to the appropriate area as directed Daily.    Bacterial vaginosis    Discussed findings on exam and wet prep. It is likely resolved because she has been treated with Flagyl 500mg BID x 7 days. Vulvar itching is likely unrelated to BV given location and presentation. I recommend she alternate clotrimazole and triamcinolone ointment once daily each. Do this for 1 week and if well managed, stop use and monitor. Pt can restart use of steroid ointment PRN. I discussed vulvar hygiene guidelines and pt agrees to make changes accordingly.     RTC if symptoms persist, worsen or recur. Pt agrees with this plan of care.     She just had pap testing with Dr. Rodríguez within the last year.

## 2020-01-23 ENCOUNTER — OFFICE VISIT (OUTPATIENT)
Dept: CARDIAC SURGERY | Facility: CLINIC | Age: 50
End: 2020-01-23

## 2020-01-23 VITALS
DIASTOLIC BLOOD PRESSURE: 90 MMHG | WEIGHT: 242 LBS | HEART RATE: 78 BPM | SYSTOLIC BLOOD PRESSURE: 138 MMHG | HEIGHT: 65 IN | BODY MASS INDEX: 40.32 KG/M2 | OXYGEN SATURATION: 100 %

## 2020-01-23 DIAGNOSIS — R60.0 EDEMA LEG: ICD-10-CM

## 2020-01-23 DIAGNOSIS — I87.8 VENOUS CONGESTION: Primary | ICD-10-CM

## 2020-01-23 PROCEDURE — 99214 OFFICE O/P EST MOD 30 MIN: CPT | Performed by: NURSE PRACTITIONER

## 2020-01-23 NOTE — PROGRESS NOTES
Subjective   Patient ID: Fidelina Dailey is a 49 y.o. 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  Does watch salt intake.  Reports edema is improved SP BP meds (HCTZ)/control.  No hx of DVT.  Presents today for vascular evaluation  Risk Factors:  HTN, Obese    The following portions of the patient's history were reviewed and updated as appropriate: allergies, current medications, past family history, past medical history, past social history, past surgical history and problem list.  Past Medical History:   Diagnosis Date   • Anxiety    • Hyperlipidemia    • Hypertension    • Yeast infection      Past Surgical History:   Procedure Laterality Date   • BILATERAL BREAST REDUCTION  2018   • EXPLORATORY LAPAROTOMY     • GALLBLADDER SURGERY     • GASTRIC SLEEVE LAPAROSCOPIC     • KIDNEY STONE SURGERY  2017   • SKIN SURGERY     • TUBAL ABDOMINAL LIGATION             No Known Allergies    Current Outpatient Medications:   •  calcium carbonate-vitamin d 600-400 MG-UNIT per tablet, Take 1 tablet by mouth., Disp: , Rfl:   •  clotrimazole (LOTRIMIN) 1 % cream, Apply  topically to the appropriate area as directed 2 (Two) Times a Day., Disp: 60 g, Rfl: 0  •  cyanocobalamin ([employer] VITAMIN B-12) 1000 MCG tablet, Take 1 tablet by mouth., Disp: , Rfl:   •  ferrous sulfate 325 (65 FE) MG EC tablet, Take 1 tablet by mouth Daily With Breakfast. Return to lab when bottle empty-(for low iron stores), Disp: 90 tablet, Rfl: 0  •  hydroCHLOROthiazide (HYDRODIURIL) 25 MG tablet, Take 1 tablet by mouth Daily., Disp: 90 tablet, Rfl: 3  •  ibuprofen (ADVIL,MOTRIN) 800 MG tablet, Take 800 mg by mouth Every 6 (Six) Hours As Needed. for pain, Disp: , Rfl: 0  •  lisinopril (PRINIVIL,ZESTRIL) 20 MG tablet, Take 1  tablet by mouth Daily., Disp: 30 tablet, Rfl: 5  •  Multiple Vitamins-Minerals (ONE-A-DAY WOMENS VITACRAVES PO), Take  by mouth., Disp: , Rfl:   •  pravastatin (PRAVACHOL) 20 MG tablet, Take 1 tablet by mouth Daily., Disp: 30 tablet, Rfl: 5  •  triamcinolone (KENALOG) 0.1 % ointment, Apply  topically to the appropriate area as directed Daily., Disp: 30 g, Rfl: 0  •  Cholecalciferol 4000 units capsule, Take 400 Units by mouth., Disp: , Rfl:     Review of Systems   Constitution: Positive for weight gain. Negative for decreased appetite and malaise/fatigue.   HENT: Negative for hearing loss, hoarse voice, nosebleeds and stridor.    Eyes: Negative for visual disturbance.   Cardiovascular: Positive for leg swelling. Negative for chest pain, claudication and dyspnea on exertion.        LE:  N Open sores  N color changes  N coldness.           N numbness or paresthesias  Full feeling of legs      Respiratory: Negative for hemoptysis and wheezing.    Hematologic/Lymphatic: Negative for bleeding problem. Does not bruise/bleed easily.   Skin: Positive for dry skin and flushing (Facial  ). Negative for color change and rash.   Musculoskeletal: Negative for falls, muscle cramps, muscle weakness and myalgias.   Gastrointestinal: Negative for abdominal pain, hematemesis and melena.   Genitourinary: Negative for hematuria.   Neurological: Negative for brief paralysis, numbness and paresthesias.   Psychiatric/Behavioral: Negative for altered mental status.   Allergic/Immunologic: Negative for hives.        Objective   Physical Exam   Constitutional: She is oriented to person, place, and time. She appears well-nourished. No distress.   Body mass index is 40.27 kg/m².     HENT:   Head: Normocephalic.   Mouth/Throat: Oropharynx is clear and moist.   Eyes: Pupils are equal, round, and reactive to light. Conjunctivae are normal.   Neck: Neck supple. No JVD present.   Cardiovascular: Normal rate, regular rhythm, normal heart sounds and  intact distal pulses.   Pulses:       Carotid pulses are 1+ on the right side, and 1+ on the left side.       Radial pulses are 2+ on the right side, and 2+ on the left side.        Posterior tibial pulses are 2+ on the right side, and 2+ on the left side.   Palpable pulses   Cap refill < 2 sec   Color intact  Sensation and mobility intact   Edema LLE > RLE 1+  RLE Varicose Veins thigh      Pulmonary/Chest: Effort normal and breath sounds normal. No respiratory distress. She has no wheezes.   Abdominal: Soft. Bowel sounds are normal. She exhibits no distension. There is no tenderness.   Musculoskeletal: She exhibits edema (1+). She exhibits no tenderness or deformity.   Neurological: She is alert and oriented to person, place, and time. No cranial nerve deficit or sensory deficit.   Skin: Skin is warm and dry. Capillary refill takes less than 2 seconds. No rash noted. No erythema. No pallor.   Psychiatric: Her behavior is normal. Judgment normal.   Nursing note and vitals reviewed.      Vitals:    01/23/20 0900   BP: 138/90   Pulse: 78   SpO2: 100%   Body mass index is 40.27 kg/m².    Lab Results   Component Value Date    WBC 8.73 12/05/2019    HGB 12.8 12/05/2019    HCT 40.1 12/05/2019    MCV 94.1 12/05/2019     12/05/2019     Lab Results   Component Value Date    GLUCOSE 82 12/05/2019    BUN 20 12/05/2019    CREATININE 0.68 12/05/2019    EGFRIFNONA 92 12/05/2019    EGFRIFAFRI 93 04/24/2018    BCR 29.4 (H) 12/05/2019    K 3.8 12/05/2019    CO2 28.0 12/05/2019    CALCIUM 9.7 12/05/2019    ALBUMIN 4.30 12/05/2019    AST 24 12/05/2019    ALT 29 12/05/2019     Lab Results   Component Value Date    CHOL 243 (H) 12/05/2019    TRIG 107 12/05/2019    HDL 59 12/05/2019     (H) 12/05/2019     Vit D 41.1  AIC 5.1         Assessment/Plan   Independent Review of Radiographic Studies:    None with today's visit     1. Venous congestion  Will check venous US for venous incompetencies  Continue present medication,  recommend improved BP control and checking of BP  Elevate legs 4 x a day 10 minutes at a time Ankle bone higher than hip bone.  Do toe heel exercises with elevation  Low level compression hose while standing at work  Vitamin D has been shown to promote healthy lining of arteries.  Continue   Recommend life style changes/Risk factor modification:  Recommend intermittent exercise while standing.   Do not go barefoot.  Non perfumed cream for feet for dry skin, consider BagBalm  Heart Healthy diet, consistent carbohydrates (sugar, white foods bread/rice/potatoes) with regular exercise.     - Duplex Venous Lower Extremity - Bilateral CAR; Future    2. Edema leg  Will check venous US for venous incompetencies  Continue present medication, recommend improved BP control and checking of BP  Elevate legs 4 x a day 10 minutes at a time Ankle bone higher than hip bone.  Do toe heel exercises with elevation  Low level compression hose while standing at work  Vitamin D has been shown to promote healthy lining of arteries.  Continue   Recommend life style changes/Risk factor modification:  Recommend intermittent exercise while standing.   Do not go barefoot.  Non perfumed cream for feet for dry skin, consider BagBalm  Heart Healthy diet, consistent carbohydrates (sugar, white foods bread/rice/potatoes) with regular exercise.  - Duplex Venous Lower Extremity - Bilateral CAR; Future    Will call US results.  Follow up scheduled in March for effectiveness of therapy.    Detailed discussion regarding risks, benefits, and treatment plan. Images independently reviewed. Patient understands, agrees, and wishes to proceed with plan.

## 2020-01-23 NOTE — PATIENT INSTRUCTIONS
Edema with venous congestion  Check venous US  Will call results  March FU here unless something changes  Elevate legs 4 x a day 10 minutes at a time Ankle bone higher than hip bone.  Do toe heel exercises with elevation  6-8 mmhg Compression hose   Recommend improved BP control Watch salt  Reduce caloric intake before bed

## 2020-02-18 ENCOUNTER — LAB (OUTPATIENT)
Dept: LAB | Facility: OTHER | Age: 50
End: 2020-02-18

## 2020-02-18 DIAGNOSIS — G47.00 INSOMNIA, UNSPECIFIED TYPE: ICD-10-CM

## 2020-02-18 DIAGNOSIS — E88.81 ABDOMINAL OBESITY AND METABOLIC SYNDROME: ICD-10-CM

## 2020-02-18 DIAGNOSIS — Z13.1 SCREENING FOR DIABETES MELLITUS: ICD-10-CM

## 2020-02-18 DIAGNOSIS — Z98.84 BARIATRIC SURGERY STATUS: ICD-10-CM

## 2020-02-18 DIAGNOSIS — E66.9 ABDOMINAL OBESITY AND METABOLIC SYNDROME: ICD-10-CM

## 2020-02-18 DIAGNOSIS — E61.1 IRON DEFICIENCY: ICD-10-CM

## 2020-02-18 DIAGNOSIS — E78.2 MIXED HYPERLIPIDEMIA: Chronic | ICD-10-CM

## 2020-02-18 LAB
ALBUMIN SERPL-MCNC: 3.9 G/DL (ref 3.5–5)
ALBUMIN/GLOB SERPL: 1.1 G/DL (ref 1.1–1.8)
ALP SERPL-CCNC: 70 U/L (ref 38–126)
ALT SERPL W P-5'-P-CCNC: 57 U/L
ANION GAP SERPL CALCULATED.3IONS-SCNC: 6 MMOL/L (ref 5–15)
AST SERPL-CCNC: 46 U/L (ref 14–36)
BASOPHILS # BLD AUTO: 0.02 10*3/MM3 (ref 0–0.2)
BASOPHILS NFR BLD AUTO: 0.2 % (ref 0–1.5)
BILIRUB SERPL-MCNC: 0.2 MG/DL (ref 0.2–1.3)
BUN BLD-MCNC: 15 MG/DL (ref 7–23)
BUN/CREAT SERPL: 23.1 (ref 7–25)
CALCIUM SPEC-SCNC: 9.5 MG/DL (ref 8.4–10.2)
CHLORIDE SERPL-SCNC: 105 MMOL/L (ref 101–112)
CHOLEST SERPL-MCNC: 219 MG/DL (ref 150–200)
CO2 SERPL-SCNC: 30 MMOL/L (ref 22–30)
CREAT BLD-MCNC: 0.65 MG/DL (ref 0.52–1.04)
DEPRECATED RDW RBC AUTO: 49.3 FL (ref 37–54)
EOSINOPHIL # BLD AUTO: 0.03 10*3/MM3 (ref 0–0.4)
EOSINOPHIL NFR BLD AUTO: 0.4 % (ref 0.3–6.2)
ERYTHROCYTE [DISTWIDTH] IN BLOOD BY AUTOMATED COUNT: 14.8 % (ref 12.3–15.4)
GFR SERPL CREATININE-BSD FRML MDRD: 97 ML/MIN/1.73 (ref 58–135)
GLOBULIN UR ELPH-MCNC: 3.5 GM/DL (ref 2.3–3.5)
GLUCOSE BLD-MCNC: 81 MG/DL (ref 70–99)
HBA1C MFR BLD: 5.59 % (ref 4.8–5.6)
HCT VFR BLD AUTO: 39.7 % (ref 34–46.6)
HDLC SERPL-MCNC: 54 MG/DL (ref 40–59)
HGB BLD-MCNC: 12.9 G/DL (ref 12–15.9)
IRON 24H UR-MRATE: 77 MCG/DL (ref 37–145)
IRON SATN MFR SERPL: 23 % (ref 20–50)
LDLC SERPL CALC-MCNC: 142 MG/DL
LDLC/HDLC SERPL: 2.62 {RATIO} (ref 0–3.22)
LYMPHOCYTES # BLD AUTO: 1.75 10*3/MM3 (ref 0.7–3.1)
LYMPHOCYTES NFR BLD AUTO: 21.3 % (ref 19.6–45.3)
MCH RBC QN AUTO: 30.5 PG (ref 26.6–33)
MCHC RBC AUTO-ENTMCNC: 32.5 G/DL (ref 31.5–35.7)
MCV RBC AUTO: 93.9 FL (ref 79–97)
MONOCYTES # BLD AUTO: 0.9 10*3/MM3 (ref 0.1–0.9)
MONOCYTES NFR BLD AUTO: 10.9 % (ref 5–12)
NEUTROPHILS # BLD AUTO: 5.53 10*3/MM3 (ref 1.7–7)
NEUTROPHILS NFR BLD AUTO: 67.2 % (ref 42.7–76)
PLATELET # BLD AUTO: 230 10*3/MM3 (ref 140–450)
PMV BLD AUTO: 11.3 FL (ref 6–12)
POTASSIUM BLD-SCNC: 3.3 MMOL/L (ref 3.4–5)
PROT SERPL-MCNC: 7.4 G/DL (ref 6.3–8.6)
RBC # BLD AUTO: 4.23 10*6/MM3 (ref 3.77–5.28)
SODIUM BLD-SCNC: 141 MMOL/L (ref 137–145)
T3 SERPL-MCNC: 59.7 NG/DL (ref 80–200)
T4 FREE SERPL-MCNC: 0.91 NG/DL (ref 0.93–1.7)
TIBC SERPL-MCNC: 328 MCG/DL (ref 298–536)
TRANSFERRIN SERPL-MCNC: 220 MG/DL (ref 200–360)
TRIGL SERPL-MCNC: 117 MG/DL
TSH SERPL DL<=0.05 MIU/L-ACNC: 0.61 UIU/ML (ref 0.27–4.2)
VLDLC SERPL-MCNC: 23.4 MG/DL
WBC NRBC COR # BLD: 8.23 10*3/MM3 (ref 3.4–10.8)

## 2020-02-18 PROCEDURE — 84480 ASSAY TRIIODOTHYRONINE (T3): CPT | Performed by: NURSE PRACTITIONER

## 2020-02-18 PROCEDURE — 85025 COMPLETE CBC W/AUTO DIFF WBC: CPT | Performed by: NURSE PRACTITIONER

## 2020-02-18 PROCEDURE — 84443 ASSAY THYROID STIM HORMONE: CPT | Performed by: NURSE PRACTITIONER

## 2020-02-18 PROCEDURE — 80061 LIPID PANEL: CPT | Performed by: NURSE PRACTITIONER

## 2020-02-18 PROCEDURE — 80053 COMPREHEN METABOLIC PANEL: CPT | Performed by: NURSE PRACTITIONER

## 2020-02-18 PROCEDURE — 83036 HEMOGLOBIN GLYCOSYLATED A1C: CPT | Performed by: NURSE PRACTITIONER

## 2020-02-18 PROCEDURE — 83540 ASSAY OF IRON: CPT | Performed by: NURSE PRACTITIONER

## 2020-02-18 PROCEDURE — 36415 COLL VENOUS BLD VENIPUNCTURE: CPT | Performed by: NURSE PRACTITIONER

## 2020-02-18 PROCEDURE — 84439 ASSAY OF FREE THYROXINE: CPT | Performed by: NURSE PRACTITIONER

## 2020-02-18 PROCEDURE — 84466 ASSAY OF TRANSFERRIN: CPT | Performed by: NURSE PRACTITIONER

## 2020-03-06 ENCOUNTER — OFFICE VISIT (OUTPATIENT)
Dept: FAMILY MEDICINE CLINIC | Facility: CLINIC | Age: 50
End: 2020-03-06

## 2020-03-06 VITALS
HEART RATE: 76 BPM | OXYGEN SATURATION: 99 % | WEIGHT: 248.3 LBS | SYSTOLIC BLOOD PRESSURE: 104 MMHG | HEIGHT: 65 IN | RESPIRATION RATE: 18 BRPM | TEMPERATURE: 97.4 F | DIASTOLIC BLOOD PRESSURE: 70 MMHG | BODY MASS INDEX: 41.37 KG/M2

## 2020-03-06 DIAGNOSIS — R94.6 ABNORMAL THYROID FUNCTION TEST: Primary | ICD-10-CM

## 2020-03-06 DIAGNOSIS — E78.00 HYPERCHOLESTEREMIA: Chronic | ICD-10-CM

## 2020-03-06 DIAGNOSIS — Z51.81 ENCOUNTER FOR THERAPEUTIC DRUG LEVEL MONITORING: ICD-10-CM

## 2020-03-06 DIAGNOSIS — R79.89 ELEVATED LFTS: ICD-10-CM

## 2020-03-06 LAB
ALBUMIN SERPL-MCNC: 4 G/DL (ref 3.5–5)
ALBUMIN/GLOB SERPL: 1.1 G/DL (ref 1.1–1.8)
ALP SERPL-CCNC: 64 U/L (ref 38–126)
ALT SERPL W P-5'-P-CCNC: 42 U/L
ANION GAP SERPL CALCULATED.3IONS-SCNC: 10 MMOL/L (ref 5–15)
AST SERPL-CCNC: 31 U/L (ref 14–36)
BASOPHILS # BLD AUTO: 0.03 10*3/MM3 (ref 0–0.2)
BASOPHILS NFR BLD AUTO: 0.4 % (ref 0–1.5)
BILIRUB SERPL-MCNC: 0.5 MG/DL (ref 0.2–1.3)
BUN BLD-MCNC: 18 MG/DL (ref 7–23)
BUN/CREAT SERPL: 21.7 (ref 7–25)
CALCIUM SPEC-SCNC: 9.4 MG/DL (ref 8.4–10.2)
CHLORIDE SERPL-SCNC: 102 MMOL/L (ref 101–112)
CHOLEST SERPL-MCNC: 258 MG/DL (ref 150–200)
CO2 SERPL-SCNC: 30 MMOL/L (ref 22–30)
CORTIS AM PEAK SERPL-MCNC: 8.43 MCG/DL
CREAT BLD-MCNC: 0.83 MG/DL (ref 0.52–1.04)
DEPRECATED RDW RBC AUTO: 47.7 FL (ref 37–54)
EOSINOPHIL # BLD AUTO: 0.3 10*3/MM3 (ref 0–0.4)
EOSINOPHIL NFR BLD AUTO: 3.8 % (ref 0.3–6.2)
ERYTHROCYTE [DISTWIDTH] IN BLOOD BY AUTOMATED COUNT: 14.6 % (ref 12.3–15.4)
GFR SERPL CREATININE-BSD FRML MDRD: 73 ML/MIN/1.73 (ref 58–135)
GLOBULIN UR ELPH-MCNC: 3.6 GM/DL (ref 2.3–3.5)
GLUCOSE BLD-MCNC: 82 MG/DL (ref 70–99)
HCT VFR BLD AUTO: 37.2 % (ref 34–46.6)
HDLC SERPL-MCNC: 53 MG/DL (ref 40–59)
HGB BLD-MCNC: 12 G/DL (ref 12–15.9)
LDLC SERPL CALC-MCNC: 179 MG/DL
LDLC/HDLC SERPL: 3.38 {RATIO} (ref 0–3.22)
LYMPHOCYTES # BLD AUTO: 2.19 10*3/MM3 (ref 0.7–3.1)
LYMPHOCYTES NFR BLD AUTO: 27.9 % (ref 19.6–45.3)
MCH RBC QN AUTO: 30.3 PG (ref 26.6–33)
MCHC RBC AUTO-ENTMCNC: 32.3 G/DL (ref 31.5–35.7)
MCV RBC AUTO: 93.9 FL (ref 79–97)
MONOCYTES # BLD AUTO: 0.74 10*3/MM3 (ref 0.1–0.9)
MONOCYTES NFR BLD AUTO: 9.4 % (ref 5–12)
NEUTROPHILS # BLD AUTO: 4.6 10*3/MM3 (ref 1.7–7)
NEUTROPHILS NFR BLD AUTO: 58.5 % (ref 42.7–76)
PLATELET # BLD AUTO: 247 10*3/MM3 (ref 140–450)
PMV BLD AUTO: 11.5 FL (ref 6–12)
POTASSIUM BLD-SCNC: 3.5 MMOL/L (ref 3.4–5)
PROT SERPL-MCNC: 7.6 G/DL (ref 6.3–8.6)
RBC # BLD AUTO: 3.96 10*6/MM3 (ref 3.77–5.28)
SODIUM BLD-SCNC: 142 MMOL/L (ref 137–145)
TRIGL SERPL-MCNC: 128 MG/DL
VLDLC SERPL-MCNC: 25.6 MG/DL
WBC NRBC COR # BLD: 7.86 10*3/MM3 (ref 3.4–10.8)

## 2020-03-06 PROCEDURE — 80061 LIPID PANEL: CPT | Performed by: NURSE PRACTITIONER

## 2020-03-06 PROCEDURE — 82533 TOTAL CORTISOL: CPT | Performed by: NURSE PRACTITIONER

## 2020-03-06 PROCEDURE — 99214 OFFICE O/P EST MOD 30 MIN: CPT | Performed by: NURSE PRACTITIONER

## 2020-03-06 PROCEDURE — 84443 ASSAY THYROID STIM HORMONE: CPT | Performed by: NURSE PRACTITIONER

## 2020-03-06 PROCEDURE — 86376 MICROSOMAL ANTIBODY EACH: CPT | Performed by: NURSE PRACTITIONER

## 2020-03-06 PROCEDURE — 85025 COMPLETE CBC W/AUTO DIFF WBC: CPT | Performed by: NURSE PRACTITIONER

## 2020-03-06 PROCEDURE — G0481 DRUG TEST DEF 8-14 CLASSES: HCPCS | Performed by: NURSE PRACTITIONER

## 2020-03-06 PROCEDURE — 86800 THYROGLOBULIN ANTIBODY: CPT | Performed by: NURSE PRACTITIONER

## 2020-03-06 PROCEDURE — 83001 ASSAY OF GONADOTROPIN (FSH): CPT | Performed by: NURSE PRACTITIONER

## 2020-03-06 PROCEDURE — 84480 ASSAY TRIIODOTHYRONINE (T3): CPT | Performed by: NURSE PRACTITIONER

## 2020-03-06 PROCEDURE — 80307 DRUG TEST PRSMV CHEM ANLYZR: CPT | Performed by: NURSE PRACTITIONER

## 2020-03-06 PROCEDURE — 83002 ASSAY OF GONADOTROPIN (LH): CPT | Performed by: NURSE PRACTITIONER

## 2020-03-06 PROCEDURE — 80053 COMPREHEN METABOLIC PANEL: CPT | Performed by: NURSE PRACTITIONER

## 2020-03-06 PROCEDURE — 36415 COLL VENOUS BLD VENIPUNCTURE: CPT | Performed by: NURSE PRACTITIONER

## 2020-03-06 PROCEDURE — 84439 ASSAY OF FREE THYROXINE: CPT | Performed by: NURSE PRACTITIONER

## 2020-03-06 NOTE — PROGRESS NOTES
Chief Complaint   Patient presents with   • Follow-up     12 wk BP     Subjective   Fidelina Dailey is a 49 y.o. female who presents to the office for review of recent labs with new diagnoses. Thyroid function tests and liver function tests abnormal. Low potassium as well.    The following portions of the patient's history were reviewed and updated as appropriate: allergies, current medications, past family history, past medical history, past social history, past surgical history and problem list.    History of Present Illness   Labs 2/18/2020  Lipid; Tchol 219    CMP:K 3.3  AST 46   ALT 57  T3 59.7  T4 0.91  TSH normal    Elevated LFT: denies recent ETOH use. No recent flu like or viral infection. No history of NAFLD.  Hyperlipidemia currently on pravastatin 20mg, reports had been off statin for about a year prior to that test, and had resumed thereafter as requested, currently reports 100% compliance with statin therapy. Denies adverse effects.  Abnormal T3, mildly suppressed T4, normal TSH: needs further workup. Previously normal thyroid function tests, denies recent illness surrounding weeks of lab draw.  Iron Deficiency: currently on oral iron replacement, levels normal on 2/18/20, stopping replacement today.   B12 deficiency: managed with oral replacement, recent labs normal.   Insomnia: managed with OTCs at present. Tried Sonata but insurance would not cover and cost prohibitive.   HTN: BP well controlled at goal today at 104/70. She reports 100% compliance with lisinopril 20mg and HCTZ 25mg daily   Vit D deficiency: managed with oral replacement therapy weekly. Not due for labs.    New complaints today: none     HPI, ROS  and PE from most recent  Visit carried forward and updated as appropriate for current situation.  Past Medical History:   Diagnosis Date   • Anxiety    • Hyperlipidemia    • Hypertension    • Yeast infection           Family History   Problem Relation Age of Onset   • Hypertension  "Mother    • Cancer Mother    • No Known Problems Father    • Down syndrome Son    • Breast cancer Maternal Grandmother         Review of Systems   Constitutional: Positive for fatigue (chronically). Negative for activity change, fever and unexpected weight change.   HENT: Negative.    Eyes: Negative.    Respiratory: Negative.  Negative for cough, chest tightness and shortness of breath.    Cardiovascular: Positive for leg swelling (LLE chronic ). Negative for chest pain and palpitations.   Gastrointestinal: Positive for diarrhea. Negative for abdominal distention, abdominal pain, nausea and vomiting.        Chronic due gastric surgery     Endocrine: Negative.    Genitourinary: Negative.  Negative for decreased urine volume, dysuria, frequency, pelvic pain, urgency, vaginal bleeding, vaginal discharge (itchy, no discharge) and vaginal pain.   Musculoskeletal: Negative.    Skin: Negative.  Negative for color change, pallor, rash and wound.   Allergic/Immunologic: Negative.    Neurological: Negative.    Hematological: Negative.    Psychiatric/Behavioral: Positive for sleep disturbance. Negative for confusion and suicidal ideas. The patient is not nervous/anxious.         Daysleeper due to night shift work. Difficulty staying asleep, no trouble falling asleep.   All other systems reviewed and are negative.      Objective   Vitals:    03/06/20 0826   BP: 104/70   BP Location: Left arm   Patient Position: Sitting   Cuff Size: Adult   Pulse: 76   Resp: 18   Temp: 97.4 °F (36.3 °C)   TempSrc: Tympanic   SpO2: 99%   Weight: 113 kg (248 lb 4.8 oz)   Height: 165.1 cm (65\")   PainSc: 0-No pain     Physical Exam   Constitutional: She is oriented to person, place, and time. She appears well-developed and well-nourished.   obesity   HENT:   Head: Normocephalic and atraumatic.   Eyes: Pupils are equal, round, and reactive to light. Conjunctivae are normal.   Neck: Normal range of motion. Neck supple. No JVD present. No tracheal " deviation present. No thyromegaly present.   Cardiovascular: Normal rate, regular rhythm, normal heart sounds and intact distal pulses. Exam reveals no gallop and no friction rub.   No murmur heard.  Pulmonary/Chest: Effort normal and breath sounds normal. No respiratory distress. She has no wheezes. She has no rales. She exhibits no tenderness.   Abdominal: Soft. Bowel sounds are normal. She exhibits no distension and no mass. There is no tenderness. There is no rebound and no guarding. No hernia.   Musculoskeletal: Normal range of motion. She exhibits no edema, tenderness or deformity.   Lymphadenopathy:     She has no cervical adenopathy.   Neurological: She is alert and oriented to person, place, and time. No cranial nerve deficit.   Skin: Skin is warm and dry. Capillary refill takes 2 to 3 seconds. No erythema. No pallor.   Psychiatric: She has a normal mood and affect. Her behavior is normal. Judgment and thought content normal.   Nursing note and vitals reviewed.      Assessment/Plan   Fidelina was seen today for follow-up.    Diagnoses and all orders for this visit:    Abnormal thyroid function test  Comments:  further workup planned, new problem  Orders:  -     Anti-Thyroglobulin Antibody  -     T3  -     T4, Free  -     Thyroid Peroxidase Antibody  -     TSH  -     Cortisol - AM  -     Follicle stimulating hormone  -     Luteinizing hormone    Encounter for therapeutic drug level monitoring  -     ToxASSURE Select 13 Discrete -    Hypercholesteremia  -     Lipid Panel  -     Lipid Panel; Future    Elevated LFTs  Comments:  new problem, repeat labs today if still elevated get hepatitis panel and ultrasound liver.  Orders:  -     CBC & Differential  -     Comprehensive Metabolic Panel  -     CBC Auto Differential           PHQ-2/PHQ-9 Depression Screening 3/6/2020   Little interest or pleasure in doing things 0   Feeling down, depressed, or hopeless 0   Trouble falling or staying asleep, or sleeping too much 0    Feeling tired or having little energy 0   Poor appetite or overeating 0   Feeling bad about yourself - or that you are a failure or have let yourself or your family down 0   Trouble concentrating on things, such as reading the newspaper or watching television 0   Moving or speaking so slowly that other people could have noticed. Or the opposite - being so fidgety or restless that you have been moving around a lot more than usual 0   Thoughts that you would be better off dead, or of hurting yourself in some way 0   Total Score 0       Crystal L Dominic APRN         Return in about 3 months (around 6/6/2020).    There are no Patient Instructions on file for this visit.    Lab on 02/18/2020   Component Date Value Ref Range Status   • Glucose 02/18/2020 81  70 - 99 mg/dL Final   • BUN 02/18/2020 15  7 - 23 mg/dL Final   • Creatinine 02/18/2020 0.65  0.52 - 1.04 mg/dL Final   • Sodium 02/18/2020 141  137 - 145 mmol/L Final   • Potassium 02/18/2020 3.3* 3.4 - 5.0 mmol/L Final   • Chloride 02/18/2020 105  101 - 112 mmol/L Final   • CO2 02/18/2020 30.0  22.0 - 30.0 mmol/L Final   • Calcium 02/18/2020 9.5  8.4 - 10.2 mg/dL Final   • Total Protein 02/18/2020 7.4  6.3 - 8.6 g/dL Final   • Albumin 02/18/2020 3.90  3.50 - 5.00 g/dL Final   • ALT (SGPT) 02/18/2020 57* <=35 U/L Final   • AST (SGOT) 02/18/2020 46* 14 - 36 U/L Final   • Alkaline Phosphatase 02/18/2020 70  38 - 126 U/L Final   • Total Bilirubin 02/18/2020 0.2  0.2 - 1.3 mg/dL Final   • eGFR Non African Amer 02/18/2020 97  58 - 135 mL/min/1.73 Final   • Globulin 02/18/2020 3.5  2.3 - 3.5 gm/dL Final   • A/G Ratio 02/18/2020 1.1  1.1 - 1.8 g/dL Final   • BUN/Creatinine Ratio 02/18/2020 23.1  7.0 - 25.0 Final   • Anion Gap 02/18/2020 6.0  5.0 - 15.0 mmol/L Final   • Total Cholesterol 02/18/2020 219* 150 - 200 mg/dL Final   • Triglycerides 02/18/2020 117  <=150 mg/dL Final   • HDL Cholesterol 02/18/2020 54  40 - 59 mg/dL Final   • LDL Cholesterol  02/18/2020 142* <=100  mg/dL Final   • VLDL Cholesterol 02/18/2020 23.4  mg/dL Final   • LDL/HDL Ratio 02/18/2020 2.62  0.00 - 3.22 Final   • Hemoglobin A1C 02/18/2020 5.59  4.80 - 5.60 % Final   • Free T4 02/18/2020 0.91* 0.93 - 1.70 ng/dL Final   • TSH 02/18/2020 0.606  0.270 - 4.200 uIU/mL Final   • T3, Total 02/18/2020 59.7* 80.0 - 200.0 ng/dl Final   • Iron 02/18/2020 77  37 - 145 mcg/dL Final   • Iron Saturation 02/18/2020 23  20 - 50 % Final   • Transferrin 02/18/2020 220  200 - 360 mg/dL Final   • TIBC 02/18/2020 328  298 - 536 mcg/dL Final   • WBC 02/18/2020 8.23  3.40 - 10.80 10*3/mm3 Final   • RBC 02/18/2020 4.23  3.77 - 5.28 10*6/mm3 Final   • Hemoglobin 02/18/2020 12.9  12.0 - 15.9 g/dL Final   • Hematocrit 02/18/2020 39.7  34.0 - 46.6 % Final   • MCV 02/18/2020 93.9  79.0 - 97.0 fL Final   • MCH 02/18/2020 30.5  26.6 - 33.0 pg Final   • MCHC 02/18/2020 32.5  31.5 - 35.7 g/dL Final   • RDW 02/18/2020 14.8  12.3 - 15.4 % Final   • RDW-SD 02/18/2020 49.3  37.0 - 54.0 fl Final   • MPV 02/18/2020 11.3  6.0 - 12.0 fL Final   • Platelets 02/18/2020 230  140 - 450 10*3/mm3 Final   • Neutrophil % 02/18/2020 67.2  42.7 - 76.0 % Final   • Lymphocyte % 02/18/2020 21.3  19.6 - 45.3 % Final   • Monocyte % 02/18/2020 10.9  5.0 - 12.0 % Final   • Eosinophil % 02/18/2020 0.4  0.3 - 6.2 % Final   • Basophil % 02/18/2020 0.2  0.0 - 1.5 % Final   • Neutrophils, Absolute 02/18/2020 5.53  1.70 - 7.00 10*3/mm3 Final   • Lymphocytes, Absolute 02/18/2020 1.75  0.70 - 3.10 10*3/mm3 Final   • Monocytes, Absolute 02/18/2020 0.90  0.10 - 0.90 10*3/mm3 Final   • Eosinophils, Absolute 02/18/2020 0.03  0.00 - 0.40 10*3/mm3 Final   • Basophils, Absolute 02/18/2020 0.02  0.00 - 0.20 10*3/mm3 Final   Hospital Outpatient Visit on 02/04/2020   Component Date Value Ref Range Status   • Right Greater Saph BK Vessel 02/04/2020 1   Final   • Right Greater Saph BK Compress 02/04/2020 N   Final   • Right Popliteal Spont 02/04/2020 1   Final   • Right Greater Saph AK  Vessel 02/04/2020 1   Final   • Right Varicosity BK Vessel 02/04/2020 1   Final   • Left External Iliac Augment 02/04/2020 Y   Final   • Left External Iliac Compress 02/04/2020 C   Final   • Left Proximal Femoral Spont 02/04/2020 1   Final   • Left Mid Femoral Spont 02/04/2020 1   Final   • Left Distal Femoral Spont 02/04/2020 1   Final   • Left Popliteal Spont 02/04/2020 1   Final   • Left Greater Saph AK Vessel 02/04/2020 1   Final   • Left Greater Saph BK Vessel 02/04/2020 1   Final   • Right External Iliac Spont 02/04/2020 Y   Final   • Right External Iliac Phasic 02/04/2020 Y   Final   • Right External Iliac Augment 02/04/2020 Y   Final   • Right External Iliac Competent 02/04/2020 Y   Final   • Right External Iliac Compress 02/04/2020 C   Final   • Right Common Femoral Spont 02/04/2020 Y   Final   • Right Common Femoral Phasic 02/04/2020 Y   Final   • Right Common Femoral Augment 02/04/2020 Y   Final   • Right Common Femoral Competent 02/04/2020 Y   Final   • Right Common Femoral Compress 02/04/2020 C   Final   • Right Saphenofemoral Junction Spont 02/04/2020 Y   Final   • Right Saphenofemoral Junction Phas* 02/04/2020 Y   Final   • Right Saphenofemoral Junction Augm* 02/04/2020 Y   Final   • Right Saphenofemoral Junction Comp* 02/04/2020 Y   Final   • Right Saphenofemoral Junction Comp* 02/04/2020 C   Final   • Right Profunda Femoral Compress 02/04/2020 C   Final   • Right Proximal Femoral Compress 02/04/2020 C   Final   • Right Mid Femoral Spont 02/04/2020 Y   Final   • Right Mid Femoral Phasic 02/04/2020 Y   Final   • Right Mid Femoral Augment 02/04/2020 Y   Final   • Right Mid Femoral Competent 02/04/2020 Y   Final   • Right Mid Femoral Compress 02/04/2020 C   Final   • Right Distal Femoral Compress 02/04/2020 C   Final   • Right Popliteal Spont 02/04/2020 N   Final   • Right Popliteal Phasic 02/04/2020 N   Final   • Right Popliteal Augment 02/04/2020 Y   Final   • Right Popliteal Competent 02/04/2020 N    Final   • Right Popliteal Compress 02/04/2020 N   Final   • Right Greater Saph AK Spont 02/04/2020 Y   Final   • Right Greater Saph AK Compress 02/04/2020 N   Final   • Right Greater Saph BK Spont 02/04/2020 Y   Final   • Right Greater Saph BK Phasic 02/04/2020 Y   Final   • Right Greater Saph BK Augment 02/04/2020 Y   Final   • Right Greater Saph BK Competent 02/04/2020 N   Final   • Right Lesser Saph Compress 02/04/2020 C   Final   • Right Varicosity BK Compress 02/04/2020 N   Final   • Left External Iliac Spont 02/04/2020 Y   Final   • Left External Iliac Phasic 02/04/2020 Y   Final   • Left External Iliac Competent 02/04/2020 Y   Final   • Left Common Femoral Spont 02/04/2020 Y   Final   • Left Common Femoral Phasic 02/04/2020 Y   Final   • Left Common Femoral Augment 02/04/2020 Y   Final   • Left Common Femoral Competent 02/04/2020 Y   Final   • Left Common Femoral Compress 02/04/2020 C   Final   • Left Saphenofemoral Junction Spont 02/04/2020 Y   Final   • Left Saphenofemoral Junction Phasic 02/04/2020 Y   Final   • Left Saphenofemoral Junction Augme* 02/04/2020 N   Final   • Left Saphenofemoral Junction Compe* 02/04/2020 Y   Final   • Left Saphenofemoral Junction Compr* 02/04/2020 C   Final   • Left Proximal Femoral Spont 02/04/2020 N   Final   • Left Proximal Femoral Phasic 02/04/2020 N   Final   • Left Proximal Femoral Augment 02/04/2020 N   Final   • Left Proximal Femoral Competent 02/04/2020 N   Final   • Left Proximal Femoral Compress 02/04/2020 N   Final   • Left Mid Femoral Spont 02/04/2020 N   Final   • Left Mid Femoral Phasic 02/04/2020 N   Final   • Left Mid Femoral Augment 02/04/2020 N   Final   • Left Mid Femoral Competent 02/04/2020 N   Final   • Left Mid Femoral Compress 02/04/2020 N   Final   • Left Distal Femoral Spont 02/04/2020 N   Final   • Left Distal Femoral Phasic 02/04/2020 N   Final   • Left Distal Femoral Augment 02/04/2020 N   Final   • Left Distal Femoral Competent 02/04/2020 N    Final   • Left Distal Femoral Compress 02/04/2020 N   Final   • Left Popliteal Spont 02/04/2020 N   Final   • Left Popliteal Phasic 02/04/2020 N   Final   • Left Popliteal Augment 02/04/2020 N   Final   • Left Popliteal Competent 02/04/2020 N   Final   • Left Popliteal Compress 02/04/2020 N   Final   • Left Greater Saph AK Competent 02/04/2020 N   Final   • Left Greater Saph AK Compress 02/04/2020 N   Final   • Left Greater Saph BK Spont 02/04/2020 Y   Final   • Left Greater Saph BK Phasic 02/04/2020 N   Final   • Left Greater Saph BK Compress 02/04/2020 N   Final   • Left Varicosity AK Compress 02/04/2020 C   Final   Lab on 12/13/2019   Component Date Value Ref Range Status   • Report Summary 12/13/2019 FINAL   Final    ====================================================================  TOXASSURE SELECT 13 EDWAR-DIS  ====================================================================  Test                             Result       Flag       Units    NO DRUGS DETECTED.  ====================================================================  Test                      Result    Flag   Units      Ref Range    Creatinine              77               mg/dL      >=20  ====================================================================  Declared Medications:   Medication list was not provided.  ====================================================================  For clinical consultation, please call [phone].  ====================================================================   • Ethanol Screen Urine (Ref) 12/13/2019 Negative   Final   • Ethanol, Urine 12/13/2019 Not Detected  g/dL Final   • Amphetamine, Urine Qual 12/13/2019 Negative   Final   • Methamphetamine, Urine 12/13/2019 Not Detected  ng/mg creat Final   • Amphetamine, Urine 12/13/2019 Not Detected  ng/mg creat Final   • MDMA URINE 12/13/2019 Not Detected  ng/mg creat Final   • MDA 12/13/2019 Not Detected  ng/mg creat Final   • Benzodiazepine Screen,  Urine 12/13/2019 Negative   Final   • DIAZEPAM URINE QUANT (REF) 12/13/2019 Not Detected  ng/mg creat Final   • Desmethyldiazepam 12/13/2019 Not Detected  ng/mg creat Final   • Oxazepam, urine 12/13/2019 Not Detected  ng/mg creat Final   • Temazepam, urine 12/13/2019 Not Detected  ng/mg creat Final    Expected metabolism of benzodiazepine class drugs:   Parent Drug       Detected Metabolites   -----------       --------------------   Diazepam:         Desmethyldiazepam, Temazepam, Oxazepam   Chlordiazepoxide: Desmethyldiazepam, Oxazepam   Clorazepate:      Desmethyldiazepam, Oxazepam   Halazepam:        Desmethyldiazepam, Oxazepam   Temazepam:        Oxazepam   Oxazepam:         None   • ALPRAZOLAM 12/13/2019 Not Detected  ng/mg creat Final   • ALPHA-HYDROXYALPRAZOLAM UR, QT (RE* 12/13/2019 Not Detected  ng/mg creat Final   • DESALKLFLURAZEPAM UR QUANT (REF) 12/13/2019 Not Detected  ng/mg creat Final   • LORAZEPAM URINE QUANT (REF) 12/13/2019 Not Detected  ng/mg creat Final   • Alpha-hydroxytriazolam, Urine 12/13/2019 Not Detected  ng/mg creat Final   • Clonazepam Urine 12/13/2019 Not Detected  ng/mg creat Final   • 7-Aminoclonazepam Urine 12/13/2019 Not Detected  ng/mg creat Final   • MIDAZOLAM UR, QUANT (REF) 12/13/2019 Not Detected  ng/mg creat Final   • Alpha-hydroxymidazolam, Urine 12/13/2019 Not Detected  ng/mg creat Final   • Flunitrazepam 12/13/2019 Not Detected  ng/mg creat Final   • DESMETHYLFLUNITRAZEPAM 12/13/2019 Not Detected  ng/mg creat Final   • Cocaine & Metabolite 12/13/2019 Negative   Final   • Cocaine Screen, Urine 12/13/2019 Not Detected  ng/mg creat Final   • Benzoylecgonine, Urine 12/13/2019 Not Detected  ng/mg creat Final   • Cocaethylene Ur 12/13/2019 Not Detected  ng/mg creat Final   • THC, Urine 12/13/2019 Negative   Final   • Carboxy THC, Urine 12/13/2019 Not Detected  ng/mg creat Final   • 6-ACETYLMORPHINE 12/13/2019 Negative   Final   • 6-acetylmorphine 12/13/2019 Not Detected  ng/mg  creat Final   • Opiate Class 12/13/2019 Negative   Final   • Codeine, Urine 12/13/2019 Not Detected  ng/mg creat Final   • Morphine, Urine 12/13/2019 Not Detected  ng/mg creat Final   • normorphine 12/13/2019 Not Detected  ng/mg creat Final   • Norcodeine Ur 12/13/2019 Not Detected  ng/mg creat Final   • Hydrocodone UR 12/13/2019 Not Detected  ng/mg creat Final   • Hydromorphone Urine 12/13/2019 Not Detected  ng/mg creat Final   • Dihydrocodeine, Urine 12/13/2019 Not Detected  ng/mg creat Final   • Opiates, Norhydrocodone, Urine 12/13/2019 Not Detected  ng/mg creat Final    Expected metabolism of opiate class drugs:  Parent Drug       Detected Metabolites   -----------       --------------------   Codeine:          Major:  Morphine, Norcodeine                     Minor:  Hydrocodone, Hydromorphone,                             Dihydrocodeine, Norhydrocodone,                             Normorphine   Morphine:         Major:  Normorphine                     Minor:  Hydromorphone   Hydrocodone:      Hydromorphone, Dihydrocodeine,                     Norhydrocodone   Hydromorphone:    None   Dihydrocodeine:   None   Heroin:           6-Acetylmorphine (if included),                     Morphine, Normorphine                     Codeine, in small amounts in comparison                      to morphine, is often detected when                      heroin is the source drug.   • Oxycodone Class Ur 12/13/2019 Negative   Final   • Oxycodone, Confirmation, Urine 12/13/2019 Not Detected  ng/mg creat Final   • Oxymorphone UR 12/13/2019 Not Detected  ng/mg creat Final   • Opiates, Noroxycodone, Urine 12/13/2019 Not Detected  ng/mg creat Final   • Noroxymorphone 12/13/2019 Not Detected  ng/mg creat Final    Expected metabolism of oxycodone class drugs:   Parent Drug       Detected Metabolites   -----------       --------------------   Oxycodone:        Oxymorphone, Noroxycodone, Noroxymorphone   Oxymorphone:      Noroxymorphone   •  Methadone 12/13/2019 Negative   Final   • Methadone, Quantitative 12/13/2019 Not Detected  ng/mg creat Final   • EDDP 12/13/2019 Not Detected  ng/mg creat Final   • Fentanyl and Analogues, Ur 12/13/2019 Negative   Final   • Fentanyl, Urine 12/13/2019 Not Detected  ng/mg creat Final   • Norfentanyl Urine 12/13/2019 Not Detected  ng/mg creat Final   • Sufentanil, Ur 12/13/2019 Not Detected  ng/mg creat Final   • Alfentanil, Ur 12/13/2019 Not Detected  ng/mg creat Final   • BUPRENORPHINE 12/13/2019 Negative   Final   • Buprenorphine, Urine 12/13/2019 Not Detected  ng/mg creat Final   • Norbuprenorphine 12/13/2019 Not Detected  ng/mg creat Final   • Tapentadol 12/13/2019 Negative   Final   • Tapentadol Ur 12/13/2019 Not Detected  ng/mg creat Final   • Opoidss other, UR 12/13/2019 Negative   Final   • Tramadol, Urine 12/13/2019 Not Detected  ng/mg creat Final   • O-desmethyltramadol, Ur 12/13/2019 Not Detected  ng/mg creat Final   • N-Desmethyltramadol, U 12/13/2019 Not Detected  ng/mg creat Final   • BARBITURATES, URINE 12/13/2019 Negative   Final   • Amobarbital, Urine 12/13/2019 Not Detected   Final   • Barbital 12/13/2019 Not Detected   Final   • Butabarbital, Ur 12/13/2019 Not Detected   Final   • Butalbital Screen, Urine 12/13/2019 Not Detected   Final   • Mephobarbital Urine 12/13/2019 Not Detected   Final   • Pentobarbital UR 12/13/2019 Not Detected   Final   • Phenobarbital 12/13/2019 Not Detected   Final   • Secobarbital, urine 12/13/2019 Not Detected   Final   • Thiopental, Ur 12/13/2019 Not Detected   Final   • Creatinine, Urine 12/13/2019 77  mg/dL Final    REFERENCE RANGE: Ref Range>=20   • Level of Detection: 12/13/2019 Comment   Final    TESTING THRESHOLDS ARE AS FOLLOWS:  AMPHETAMINES: 50 ng/mL  BENZODIAZEPINES: 20 ng/mL  COCAINE / METABOLITE: 50 ng/mL  ALCOHOL, ETHYL: 0.020 gm/dL  CANNABINOIDS: total-20 ng/mL, carboxy-THC-2 ng/mL  6-ACETYLMORPHINE: 10 ng/mL  OPIATE CLASS: 50 ng/mL/mL  OXYCODONE CLASS:  50 ng/mL  METHADONE: 50 ng/mL/mL  BUPRENORPHINE: buprenorphine-1.0 ng/mL,                 norbuprenorphine-5 ng/mL  FENTANYL / ANALOGUES: fentanyl-1.0 ng/mL, others-5.0 ng/mL  TAPENTADOL: 50 ng/mL  TRAMADOL: 50 ng/mL  BARBITURATES: 200 ng/mL  This test was developed and its performance characteristics  determined by [facility].  It has not been cleared or approved  by the Food and Drug Administration.   Lab on 12/05/2019   Component Date Value Ref Range Status   • Urine Culture 12/05/2019 25,000 CFU/mL Mixed Tana Isolated   Final   • Color, UA 12/05/2019 Yellow  Yellow, Straw Final   • Appearance, UA 12/05/2019 Clear  Clear Final   • pH, UA 12/05/2019 5.5  5.5 - 8.0 Final   • Specific Gravity, UA 12/05/2019 >=1.030  1.005 - 1.030 Final   • Glucose, UA 12/05/2019 Negative  Negative Final   • Ketones, UA 12/05/2019 Negative  Negative Final   • Bilirubin, UA 12/05/2019 Negative  Negative Final   • Blood, UA 12/05/2019 Trace* Negative Final   • Protein, UA 12/05/2019 Negative  Negative Final   • Leuk Esterase, UA 12/05/2019 Negative  Negative Final   • Nitrite, UA 12/05/2019 Negative  Negative Final   • Urobilinogen, UA 12/05/2019 0.2 E.U./dL  0.2 - 1.0 E.U./dL Final   • RBC, UA 12/05/2019 0-2* None Seen /HPF Final   • WBC, UA 12/05/2019 0-2* None Seen /HPF Final   • Bacteria, UA 12/05/2019 1+* None Seen /HPF Final   • Squamous Epithelial Cells, UA 12/05/2019 3-6* None Seen, 0-2 /HPF Final   • Hyaline Casts, UA 12/05/2019 None Seen  None Seen /LPF Final   • Methodology 12/05/2019 Manual Light Microscopy   Final   Office Visit on 12/05/2019   Component Date Value Ref Range Status   • Ferritin 12/05/2019 53.10  13.00 - 150.00 ng/mL Final   • Iron 12/05/2019 41  37 - 145 mcg/dL Final   • Iron Saturation 12/05/2019 10* 20 - 50 % Final   • Transferrin 12/05/2019 270  200 - 360 mg/dL Final   • TIBC 12/05/2019 402  298 - 536 mcg/dL Final   • Glucose 12/05/2019 82  70 - 99 mg/dL Final   • BUN 12/05/2019 20  7 - 23 mg/dL Final   •  Creatinine 12/05/2019 0.68  0.52 - 1.04 mg/dL Final   • Sodium 12/05/2019 143  137 - 145 mmol/L Final   • Potassium 12/05/2019 3.8  3.4 - 5.0 mmol/L Final   • Chloride 12/05/2019 107  101 - 112 mmol/L Final   • CO2 12/05/2019 28.0  22.0 - 30.0 mmol/L Final   • Calcium 12/05/2019 9.7  8.4 - 10.2 mg/dL Final   • Total Protein 12/05/2019 7.9  6.3 - 8.6 g/dL Final   • Albumin 12/05/2019 4.30  3.50 - 5.00 g/dL Final   • ALT (SGPT) 12/05/2019 29  <=35 U/L Final   • AST (SGOT) 12/05/2019 24  14 - 36 U/L Final   • Alkaline Phosphatase 12/05/2019 76  38 - 126 U/L Final   • Total Bilirubin 12/05/2019 0.3  0.2 - 1.3 mg/dL Final   • eGFR Non African Amer 12/05/2019 92  58 - 135 mL/min/1.73 Final   • Globulin 12/05/2019 3.6* 2.3 - 3.5 gm/dL Final   • A/G Ratio 12/05/2019 1.2  1.1 - 1.8 g/dL Final   • BUN/Creatinine Ratio 12/05/2019 29.4* 7.0 - 25.0 Final   • Anion Gap 12/05/2019 8.0  5.0 - 15.0 mmol/L Final   • Total Cholesterol 12/05/2019 243* 150 - 200 mg/dL Final   • Triglycerides 12/05/2019 107  <=150 mg/dL Final   • HDL Cholesterol 12/05/2019 59  40 - 59 mg/dL Final   • LDL Cholesterol  12/05/2019 163* <=100 mg/dL Final   • VLDL Cholesterol 12/05/2019 21.4  mg/dL Final   • LDL/HDL Ratio 12/05/2019 2.76  0.00 - 3.22 Final   • Free T4 12/05/2019 1.18  0.93 - 1.70 ng/dL Final   • TSH 12/05/2019 2.820  0.270 - 4.200 uIU/mL Final   • T3, Total 12/05/2019 108.0  80.0 - 200.0 ng/dl Final   • Hemoglobin A1C 12/05/2019 5.10  4.80 - 5.60 % Final   • Folate 12/05/2019 16.80  4.78 - 24.20 ng/mL Final   • Vitamin B-12 12/05/2019 1,122* 211 - 946 pg/mL Final   • 25 Hydroxy, Vitamin D 12/05/2019 41.1  30.0 - 100.0 ng/ml Final   • Insulin, Free 12/05/2019 6.9  uU/mL Final    Reference Range:  Pubertal Children and  Adults (fasting): 0 - 17   • Insulin 12/05/2019 6.9  uU/mL Final    Non-Diabetic:  In the absence of insulin-binding antibodies,  the free and total insulin assays are equivalent. However,  this assay is intended for use in  diabetics with insulin  autoantibody present. Measurement is performed on  acid-treated samples and, therefore, the sensitivity and  absolute values by this method may differ from our direct  insulin ICMA.  Insulin Dependent Diabetic Patients:  Free Insulin levels  vary depending on the capacity and affinity of circulating  insulin-binding antibodies and the dose of insulin given to  the patient.  Total insulin levels represent free insulin  and antibody bound insulin fractions.   • WBC 12/05/2019 8.73  3.40 - 10.80 10*3/mm3 Final   • RBC 12/05/2019 4.26  3.77 - 5.28 10*6/mm3 Final   • Hemoglobin 12/05/2019 12.8  12.0 - 15.9 g/dL Final   • Hematocrit 12/05/2019 40.1  34.0 - 46.6 % Final   • MCV 12/05/2019 94.1  79.0 - 97.0 fL Final   • MCH 12/05/2019 30.0  26.6 - 33.0 pg Final   • MCHC 12/05/2019 31.9  31.5 - 35.7 g/dL Final   • RDW 12/05/2019 13.6  12.3 - 15.4 % Final   • RDW-SD 12/05/2019 45.7  37.0 - 54.0 fl Final   • MPV 12/05/2019 11.4  6.0 - 12.0 fL Final   • Platelets 12/05/2019 273  140 - 450 10*3/mm3 Final   • Neutrophil % 12/05/2019 61.2  42.7 - 76.0 % Final   • Lymphocyte % 12/05/2019 25.9  19.6 - 45.3 % Final   • Monocyte % 12/05/2019 8.4  5.0 - 12.0 % Final   • Eosinophil % 12/05/2019 4.2  0.3 - 6.2 % Final   • Basophil % 12/05/2019 0.3  0.0 - 1.5 % Final   • Neutrophils, Absolute 12/05/2019 5.34  1.70 - 7.00 10*3/mm3 Final   • Lymphocytes, Absolute 12/05/2019 2.26  0.70 - 3.10 10*3/mm3 Final   • Monocytes, Absolute 12/05/2019 0.73  0.10 - 0.90 10*3/mm3 Final   • Eosinophils, Absolute 12/05/2019 0.37  0.00 - 0.40 10*3/mm3 Final   • Basophils, Absolute 12/05/2019 0.03  0.00 - 0.20 10*3/mm3 Final   ]

## 2020-03-07 LAB
FSH SERPL-ACNC: 9.56 MIU/ML
LH SERPL-ACNC: 7.44 MIU/ML
T3 SERPL-MCNC: 117 NG/DL (ref 80–200)
T4 FREE SERPL-MCNC: 1.16 NG/DL (ref 0.93–1.7)
TSH SERPL DL<=0.05 MIU/L-ACNC: 1.64 UIU/ML (ref 0.27–4.2)

## 2020-03-08 LAB — THYROPEROXIDASE AB SERPL-ACNC: 16 IU/ML (ref 0–34)

## 2020-03-09 DIAGNOSIS — E78.00 HYPERCHOLESTEREMIA: Primary | ICD-10-CM

## 2020-03-09 LAB — THYROGLOB AB SERPL-ACNC: <1 IU/ML (ref 0–0.9)

## 2020-03-09 RX ORDER — PRAVASTATIN SODIUM 40 MG
40 TABLET ORAL DAILY
Qty: 90 TABLET | Refills: 1 | Status: SHIPPED | OUTPATIENT
Start: 2020-03-09 | End: 2020-09-02

## 2020-03-12 LAB
6-ACETYLMORPHINE: NEGATIVE
6MAM SERPLBLD-MCNC: NOT DETECTED NG/MG CREAT
7-AMINOCLONAZEPAM UR: NOT DETECTED NG/MG CREAT
A-OH ALPRAZ/CREAT UR: NOT DETECTED NG/MG CREAT
ALFENTANIL UR QL: NOT DETECTED NG/MG CREAT
ALPHA-HYDROXYMIDAZOLAM, URINE: NOT DETECTED NG/MG CREAT
ALPHA-HYDROXYTRIAZOLAM, URINE: NOT DETECTED NG/MG CREAT
AMOBARBITAL UR: NOT DETECTED
AMPHET UR QL CFM: NOT DETECTED NG/MG CREAT
AMPHETAMINES UR QL SCN: NEGATIVE
BARBITAL UR QL CFM: NOT DETECTED
BARBITURATES UR QL SCN: NEGATIVE
BENZODIAZ UR QL SCN: NEGATIVE
BENZOYLECGONINE UR: NOT DETECTED NG/MG CREAT
BUPRENORPHINE UR QL: NEGATIVE
BUPRENORPHINE UR QL: NOT DETECTED NG/MG CREAT
BUTABARBITAL UR QL: NOT DETECTED
BUTALBITAL UR QL: NOT DETECTED
CANNABINOIDS UR QL CFM: NEGATIVE
CLONAZEPAM UR QL: NOT DETECTED NG/MG CREAT
COCAETHYLENE UR QL CFM: NOT DETECTED NG/MG CREAT
COCAINE UR QL CFM: NEGATIVE
CODEINE UR QL: NOT DETECTED NG/MG CREAT
CONV COCAINE, UR: NOT DETECTED NG/MG CREAT
CONV REPORT SUMMARY: NORMAL
CREAT 24H UR-MCNC: 57 MG/DL
DESALKYLFLURAZ/CREAT UR: NOT DETECTED NG/MG CREAT
DESMETHYLFLUNITRAZEPAM: NOT DETECTED NG/MG CREAT
DIAZEPAM UR-MCNC: NOT DETECTED NG/MG CREAT
DIHYDROCODEINE UR: NOT DETECTED NG/MG CREAT
EDDP SERPL QL: NOT DETECTED NG/MG CREAT
ETHANOL SCREEN URINE (REF): NEGATIVE
ETHANOL UR-MCNC: NOT DETECTED G/DL
FENTANYL UR QL: NEGATIVE
FENTANYL+NORFENTANYL UR QL SCN: NOT DETECTED NG/MG CREAT
FLUNITRAZEPAM SERPLBLD-MCNC: NOT DETECTED NG/MG CREAT
HYDROCODONE UR QL: NOT DETECTED NG/MG CREAT
HYDROMORPHONE UR QL: NOT DETECTED NG/MG CREAT
LEVEL OF DETECTION:: NORMAL
LORAZEPAM UR-MCNC: NOT DETECTED NG/MG CREAT
LORAZEPAM/CREAT UR: NOT DETECTED NG/MG CREAT
MDA SERPLBLD-MCNC: NOT DETECTED NG/MG CREAT
MDMA UR QL SCN: NOT DETECTED NG/MG CREAT
MEPHOBARBITAL UR QL CFM: NOT DETECTED
METHADONE BLD QL SCN: NEGATIVE
METHADONE, URINE: NOT DETECTED NG/MG CREAT
METHAMPHETAMINE UR: NOT DETECTED NG/MG CREAT
MIDAZOLAM UR-MCNC: NOT DETECTED NG/MG CREAT
MORPHINE UR QL: NOT DETECTED NG/MG CREAT
N-DESMETHYLTRAMADOL, U: NOT DETECTED NG/MG CREAT
NARCOTICS UR: NEGATIVE
NORBUPRENORPHINE SERPLBLD-MCNC: NOT DETECTED NG/MG CREAT
NORCODEINE UR-MCNC: NOT DETECTED NG/MG CREAT
NORDIAZEPAM SERPL CFM-MCNC: NOT DETECTED NG/MG CREAT
NORFENTANYL UR: NOT DETECTED NG/MG CREAT
NORMORPHINE: NOT DETECTED NG/MG CREAT
NOROXYMORPHONE: NOT DETECTED NG/MG CREAT
O-DESMETHYLTRAMADOL, UR: NOT DETECTED NG/MG CREAT
OPIATES UR QL CFM: NEGATIVE
OPIATES, URINE, NORHYDROCODONE: NOT DETECTED NG/MG CREAT
OPIATES, URINE, NOROXYCODONE: NOT DETECTED NG/MG CREAT
OXAZEPAM UR QL: NOT DETECTED NG/MG CREAT
OXYCODONE UR QL: NEGATIVE
OXYCODONE UR: NOT DETECTED NG/MG CREAT
OXYMORPHONE UR: NOT DETECTED NG/MG CREAT
PENTOBARBITAL UR: NOT DETECTED
PHENOBARB UR QL: NOT DETECTED
SECOBARBITAL UR QL: NOT DETECTED
SUFENTANIL UR QL: NOT DETECTED NG/MG CREAT
TAPENTADOL SERPLBLD-MCNC: NEGATIVE NG/ML
TAPENTADOL UR-MCNC: NOT DETECTED NG/MG CREAT
TEMAZEPAM UR QL CFM: NOT DETECTED NG/MG CREAT
THC UR CFM-MCNC: NOT DETECTED NG/MG CREAT
THIOPENTAL UR QL CFM: NOT DETECTED
TRAMADOL UR: NOT DETECTED NG/MG CREAT

## 2020-03-20 ENCOUNTER — TELEPHONE (OUTPATIENT)
Dept: CARDIAC SURGERY | Facility: CLINIC | Age: 50
End: 2020-03-20

## 2020-03-20 NOTE — TELEPHONE ENCOUNTER
[facility] lewis attempted to contact pt NA  PT is to obtain and wear compression stockings and return to [place] in 2 months for a follow up visit  Pt was made aware of appt time and date

## 2020-05-04 DIAGNOSIS — I10 ESSENTIAL HYPERTENSION: Chronic | ICD-10-CM

## 2020-05-04 DIAGNOSIS — E78.2 MIXED HYPERLIPIDEMIA: Chronic | ICD-10-CM

## 2020-05-05 RX ORDER — PRAVASTATIN SODIUM 20 MG
TABLET ORAL
Qty: 30 TABLET | Refills: 5 | OUTPATIENT
Start: 2020-05-05

## 2020-05-05 RX ORDER — LISINOPRIL 20 MG/1
TABLET ORAL
Qty: 30 TABLET | Refills: 5 | Status: SHIPPED | OUTPATIENT
Start: 2020-05-05 | End: 2020-12-22

## 2020-05-29 ENCOUNTER — OFFICE VISIT (OUTPATIENT)
Dept: FAMILY MEDICINE CLINIC | Facility: CLINIC | Age: 50
End: 2020-05-29

## 2020-05-29 DIAGNOSIS — I10 ESSENTIAL HYPERTENSION: Chronic | ICD-10-CM

## 2020-05-29 DIAGNOSIS — L29.2 VULVAR ITCHING: Primary | ICD-10-CM

## 2020-05-29 PROCEDURE — 99441 PR PHYS/QHP TELEPHONE EVALUATION 5-10 MIN: CPT | Performed by: NURSE PRACTITIONER

## 2020-05-29 NOTE — PROGRESS NOTES
Chief Complaint   Patient presents with   • Vaginitis     itch     Subjective   Fidelina Dailey is a 49 y.o. female who presents to the office by telephone visit due to pandemic for recurrent vaginal itch.    The following portions of the patient's history were reviewed and updated as appropriate: allergies, current medications, past family history, past medical history, past social history, past surgical history and problem list.    History of Present Illness   You have chosen to receive care through a telephone visit. Do you consent to use a telephone visit for your medical care today? Yes  10 minutes medical discussion.  Patient reports recurrence of previously treated vaginal itch. Reports was completely relieved in the past with triamcinolone ointment, requests refill of this today.  There is no vaginal discharge, and no foul odor.  No other complaints today.    Parts of most recent relevant visit HPI, ROS  and PE may be carried forward and all are updated as appropriate for current situation.    Past Medical History:   Diagnosis Date   • Anxiety    • Hyperlipidemia    • Hypertension    • Yeast infection           Family History   Problem Relation Age of Onset   • Hypertension Mother    • Cancer Mother    • No Known Problems Father    • Down syndrome Son    • Breast cancer Maternal Grandmother         Review of Systems   Constitutional: Negative.  Negative for fever and unexpected weight change.   HENT: Negative.    Eyes: Negative.    Respiratory: Negative.  Negative for cough, chest tightness and shortness of breath.    Cardiovascular: Negative.  Negative for chest pain.   Gastrointestinal: Negative.    Endocrine: Negative.    Genitourinary: Negative.  Negative for dysuria.        Vaginal itch   Musculoskeletal: Negative.    Skin: Negative.  Negative for color change, pallor, rash and wound.   Allergic/Immunologic: Negative.    Neurological: Negative.    Hematological: Negative.    Psychiatric/Behavioral:  Negative.  Negative for sleep disturbance and suicidal ideas.   All other systems reviewed and are negative.      Objective   There were no vitals filed for this visit.  Physical Exam   Constitutional: She is oriented to person, place, and time. No distress.   Pulmonary/Chest: Effort normal. No stridor. No respiratory distress.   Neurological: She is oriented to person, place, and time.   Psychiatric: She has a normal mood and affect. Her behavior is normal. Judgment and thought content normal.       Assessment/Plan   Fidelina was seen today for vaginitis.    Diagnoses and all orders for this visit:    Mixed hyperlipidemia    Vulvar itching  -     triamcinolone (KENALOG) 0.1 % ointment; Apply  topically to the appropriate area as directed Daily.    Essential hypertension  -     Comprehensive Metabolic Panel; Future  -     CBC & Differential; Future  -     T4, Free; Future  -     TSH; Future  -     T3; Future         Recurrent episode vaginal/vulvar itch. Not due for FU thyroid/ HTN w/ labs until June. 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   PHQ-2/PHQ-9 Depression Screening 3/6/2020   Little interest or pleasure in doing things 0   Feeling down, depressed, or hopeless 0   Trouble falling or staying asleep, or sleeping too much 0   Feeling tired or having little energy 0   Poor appetite or overeating 0   Feeling bad about yourself - or that you are a failure or have let yourself or your family down 0   Trouble concentrating on things, such as reading the newspaper or watching television 0   Moving or speaking so slowly that other people could have noticed. Or the opposite - being so fidgety or restless that you have been moving around a lot more than usual 0   Thoughts that you would be better off dead, or of hurting yourself in some way 0   Total Score 0       EDITH Graham         Return in about 6 weeks (around  7/13/2020).    There are no Patient Instructions on file for this visit.

## 2020-06-18 ENCOUNTER — OFFICE VISIT (OUTPATIENT)
Dept: CARDIAC SURGERY | Facility: CLINIC | Age: 50
End: 2020-06-18

## 2020-06-18 VITALS
HEIGHT: 65 IN | DIASTOLIC BLOOD PRESSURE: 65 MMHG | WEIGHT: 241 LBS | HEART RATE: 75 BPM | OXYGEN SATURATION: 99 % | SYSTOLIC BLOOD PRESSURE: 105 MMHG | BODY MASS INDEX: 40.15 KG/M2

## 2020-06-18 DIAGNOSIS — I87.8 VENOUS CONGESTION: ICD-10-CM

## 2020-06-18 DIAGNOSIS — I83.893 VARICOSE VEINS OF BILATERAL LOWER EXTREMITIES WITH OTHER COMPLICATIONS: Primary | ICD-10-CM

## 2020-06-18 PROCEDURE — 99213 OFFICE O/P EST LOW 20 MIN: CPT | Performed by: NURSE PRACTITIONER

## 2020-06-18 NOTE — PROGRESS NOTES
Subjective   Patient ID: Fidelina Dailey is a 49 y.o. female is being seen today in follow up for BLE edema.   Chief Complaint   Patient presents with   • Follow-up   Edema improved with elevation and compression.     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  Does watch salt intake.   No hx of DVT.  Symptoms improved with weight loss, elevation, and compression.    Risk Factors:  HTN, Obese  · 02/04/20  Venous US:  There was superficial venous valvular incompetence noted in the right greater saphenous (above knee), greater saphenous (below knee) and varicosity (below knee).  There was superficial venous valvular incompetence noted in the left greater saphenous (above knee), greater saphenous (below knee) and varicosity (below knee).    The following portions of the patient's history were reviewed and updated as appropriate: allergies, current medications, past family history, past medical history, past social history, past surgical history and problem list.  Past Medical History:   Diagnosis Date   • Anxiety    • Hyperlipidemia    • Hypertension    • Yeast infection      Past Surgical History:   Procedure Laterality Date   • BILATERAL BREAST REDUCTION  2018   • EXPLORATORY LAPAROTOMY     • GALLBLADDER SURGERY     • GASTRIC SLEEVE LAPAROSCOPIC     • KIDNEY STONE SURGERY  2017   • SKIN SURGERY     • TUBAL ABDOMINAL LIGATION             No Known Allergies    Current Outpatient Medications:   •  calcium carbonate-vitamin d 600-400 MG-UNIT per tablet, Take 1 tablet by mouth., Disp: , Rfl:   •  Cholecalciferol 4000 units capsule, Take 400 Units by mouth., Disp: , Rfl:   •  cyanocobalamin ([employer] VITAMIN B-12) 1000 MCG tablet, Take 1 tablet by mouth., Disp: , Rfl:   •  hydroCHLOROthiazide (HYDRODIURIL) 25 MG tablet, Take 1 tablet by mouth Daily., Disp: 90 tablet, Rfl: 3  •  ibuprofen (ADVIL,MOTRIN) 800 MG tablet, Take 800 mg by mouth Every 6 (Six) Hours  As Needed. for pain, Disp: , Rfl: 0  •  lisinopril (PRINIVIL,ZESTRIL) 20 MG tablet, TAKE ONE TABLET BY MOUTH DAILY, Disp: 30 tablet, Rfl: 5  •  Multiple Vitamins-Minerals (ONE-A-DAY WOMENS VITACRAVES PO), Take  by mouth., Disp: , Rfl:   •  pravastatin (PRAVACHOL) 40 MG tablet, Take 1 tablet by mouth Daily. Dose increased 3/9, Disp: 90 tablet, Rfl: 1  •  triamcinolone (KENALOG) 0.1 % ointment, Apply  topically to the appropriate area as directed Daily., Disp: 60 g, Rfl: 0    Review of Systems   Constitution: Positive for weight loss (RT exercise ). Negative for decreased appetite, malaise/fatigue and weight gain.   HENT: Negative for hearing loss, hoarse voice, nosebleeds and stridor.    Eyes: Negative for visual disturbance.   Cardiovascular: Negative for chest pain, claudication, dyspnea on exertion and leg swelling.        LE:  N Open sores  N color changes  N coldness.           N numbness or paresthesias  Walking 3-4 X a week      Respiratory: Negative for hemoptysis and wheezing.    Hematologic/Lymphatic: Negative for bleeding problem. Does not bruise/bleed easily.   Skin: Positive for dry skin. Negative for color change, flushing (Facial  ) and rash.   Musculoskeletal: Negative for falls, muscle cramps, muscle weakness and myalgias.   Gastrointestinal: Negative for abdominal pain, hematemesis and melena.   Genitourinary: Negative for hematuria.   Neurological: Negative for brief paralysis, numbness and paresthesias.   Psychiatric/Behavioral: Negative for altered mental status.   Allergic/Immunologic: Negative for hives.        Objective   Physical Exam   Constitutional: She is oriented to person, place, and time. She appears well-nourished. No distress.   Body mass index is 40.1 kg/m².     HENT:   Head: Normocephalic.   Mouth/Throat: Oropharynx is clear and moist.   Eyes: Pupils are equal, round, and reactive to light. Conjunctivae are normal.   Neck: Neck supple. No JVD present.   Cardiovascular: Normal rate,  regular rhythm, normal heart sounds and intact distal pulses.   Pulses:       Carotid pulses are 1+ on the right side, and 1+ on the left side.       Radial pulses are 2+ on the right side, and 2+ on the left side.        Dorsalis pedis pulses are 2+ on the right side, and 2+ on the left side.        Posterior tibial pulses are 2+ on the right side, and 2+ on the left side.   Palpable pulses   Cap refill < 2 sec   Color intact  Sensation and mobility intact   Edema resolved  RLE Varicose Veins thigh      Pulmonary/Chest: Effort normal and breath sounds normal. No respiratory distress. She has no wheezes.   Abdominal: Soft. Bowel sounds are normal. She exhibits no distension. There is no tenderness.   Musculoskeletal: She exhibits no edema, tenderness or deformity.   Neurological: She is alert and oriented to person, place, and time. No cranial nerve deficit or sensory deficit.   Skin: Skin is warm and dry. Capillary refill takes less than 2 seconds. No rash noted. No erythema. No pallor.   Psychiatric: Her behavior is normal. Judgment normal.   Nursing note and vitals reviewed.      Vitals:    06/18/20 0906   BP: 105/65   Pulse: 75   SpO2: 99%   Body mass index is 40.1 kg/m².    Lab Results   Component Value Date    WBC 7.86 03/06/2020    HGB 12.0 03/06/2020    HCT 37.2 03/06/2020    MCV 93.9 03/06/2020     03/06/2020     Lab Results   Component Value Date    GLUCOSE 82 03/06/2020    BUN 18 03/06/2020    CREATININE 0.83 03/06/2020    EGFRIFNONA 73 03/06/2020    EGFRIFAFRI 93 04/24/2018    BCR 21.7 03/06/2020    K 3.5 03/06/2020    CO2 30.0 03/06/2020    CALCIUM 9.4 03/06/2020    ALBUMIN 4.00 03/06/2020    AST 31 03/06/2020    ALT 42 (H) 03/06/2020     Lab Results   Component Value Date    CHOL 258 (H) 03/06/2020    TRIG 128 03/06/2020    HDL 53 03/06/2020     (H) 03/06/2020     Vit D 41.1  AIC 5.1         Assessment/Plan   Independent Review of Radiographic Studies:    02/04/20  Venous US:  There was  superficial venous valvular incompetence noted in the right greater saphenous (above knee), greater saphenous (below knee) and varicosity (below knee).  There was superficial venous valvular incompetence noted in the left greater saphenous (above knee), greater saphenous (below knee) and varicosity (below knee).    1. Venous congestion  Venous incompetences demonstrated by US.   Notify if desire evaluation for intervention.   Improved with medical management.  Continue present medication,  BP control  Continue exercise  Continue:  Elevate legs 4 x a day 10 minutes at a time Ankle bone higher than hip bone.  Do toe heel exercises with elevation  Low level compression hose while standing at work  Vitamin D has been shown to promote healthy lining of arteries.  Continue    Recommend intermittent exercise while standing.   Do not go barefoot.  Non perfumed cream for feet for dry skin, consider BagBalm  Heart Healthy diet, consistent carbohydrates (sugar, white foods bread/rice/potatoes) with regular exercise.     - Duplex Venous Lower Extremity - Bilateral CAR; Future    2. Edema leg  Resolved  Continue present treatment plan       Detailed discussion regarding risks, benefits, and treatment plan. Images independently reviewed. Patient understands, agrees, and wishes to proceed with plan.

## 2020-07-10 ENCOUNTER — LAB (OUTPATIENT)
Dept: LAB | Facility: OTHER | Age: 50
End: 2020-07-10

## 2020-07-10 DIAGNOSIS — E78.00 HYPERCHOLESTEREMIA: Chronic | ICD-10-CM

## 2020-07-10 DIAGNOSIS — I10 ESSENTIAL HYPERTENSION: ICD-10-CM

## 2020-07-10 LAB
ALBUMIN SERPL-MCNC: 4.1 G/DL (ref 3.5–5)
ALBUMIN/GLOB SERPL: 1.2 G/DL (ref 1.1–1.8)
ALP SERPL-CCNC: 73 U/L (ref 38–126)
ALT SERPL W P-5'-P-CCNC: 20 U/L
ANION GAP SERPL CALCULATED.3IONS-SCNC: 6 MMOL/L (ref 5–15)
AST SERPL-CCNC: 23 U/L (ref 14–36)
BASOPHILS # BLD AUTO: 0.03 10*3/MM3 (ref 0–0.2)
BASOPHILS NFR BLD AUTO: 0.4 % (ref 0–1.5)
BILIRUB SERPL-MCNC: 0.5 MG/DL (ref 0.2–1.3)
BUN SERPL-MCNC: 17 MG/DL (ref 7–23)
BUN/CREAT SERPL: 23 (ref 7–25)
CALCIUM SPEC-SCNC: 10.1 MG/DL (ref 8.4–10.2)
CHLORIDE SERPL-SCNC: 102 MMOL/L (ref 101–112)
CHOLEST SERPL-MCNC: 209 MG/DL (ref 150–200)
CO2 SERPL-SCNC: 32 MMOL/L (ref 22–30)
CREAT SERPL-MCNC: 0.74 MG/DL (ref 0.52–1.04)
DEPRECATED RDW RBC AUTO: 45.6 FL (ref 37–54)
EOSINOPHIL # BLD AUTO: 0.38 10*3/MM3 (ref 0–0.4)
EOSINOPHIL NFR BLD AUTO: 5.3 % (ref 0.3–6.2)
ERYTHROCYTE [DISTWIDTH] IN BLOOD BY AUTOMATED COUNT: 13.7 % (ref 12.3–15.4)
GFR SERPL CREATININE-BSD FRML MDRD: 83 ML/MIN/1.73 (ref 58–135)
GLOBULIN UR ELPH-MCNC: 3.5 GM/DL (ref 2.3–3.5)
GLUCOSE SERPL-MCNC: 84 MG/DL (ref 70–99)
HCT VFR BLD AUTO: 38.6 % (ref 34–46.6)
HDLC SERPL-MCNC: 54 MG/DL (ref 40–59)
HGB BLD-MCNC: 12.7 G/DL (ref 12–15.9)
LDLC SERPL CALC-MCNC: 131 MG/DL
LDLC/HDLC SERPL: 2.43 {RATIO} (ref 0–3.22)
LYMPHOCYTES # BLD AUTO: 1.8 10*3/MM3 (ref 0.7–3.1)
LYMPHOCYTES NFR BLD AUTO: 24.9 % (ref 19.6–45.3)
MCH RBC QN AUTO: 31.1 PG (ref 26.6–33)
MCHC RBC AUTO-ENTMCNC: 32.9 G/DL (ref 31.5–35.7)
MCV RBC AUTO: 94.6 FL (ref 79–97)
MONOCYTES # BLD AUTO: 0.53 10*3/MM3 (ref 0.1–0.9)
MONOCYTES NFR BLD AUTO: 7.3 % (ref 5–12)
NEUTROPHILS NFR BLD AUTO: 4.48 10*3/MM3 (ref 1.7–7)
NEUTROPHILS NFR BLD AUTO: 62.1 % (ref 42.7–76)
PLATELET # BLD AUTO: 246 10*3/MM3 (ref 140–450)
PMV BLD AUTO: 11.6 FL (ref 6–12)
POTASSIUM SERPL-SCNC: 3.7 MMOL/L (ref 3.4–5)
PROT SERPL-MCNC: 7.6 G/DL (ref 6.3–8.6)
RBC # BLD AUTO: 4.08 10*6/MM3 (ref 3.77–5.28)
SODIUM SERPL-SCNC: 140 MMOL/L (ref 137–145)
TRIGL SERPL-MCNC: 119 MG/DL
VLDLC SERPL-MCNC: 23.8 MG/DL
WBC # BLD AUTO: 7.22 10*3/MM3 (ref 3.4–10.8)

## 2020-07-10 PROCEDURE — 84480 ASSAY TRIIODOTHYRONINE (T3): CPT | Performed by: NURSE PRACTITIONER

## 2020-07-10 PROCEDURE — 85025 COMPLETE CBC W/AUTO DIFF WBC: CPT | Performed by: NURSE PRACTITIONER

## 2020-07-10 PROCEDURE — 36415 COLL VENOUS BLD VENIPUNCTURE: CPT | Performed by: NURSE PRACTITIONER

## 2020-07-10 PROCEDURE — 80053 COMPREHEN METABOLIC PANEL: CPT | Performed by: NURSE PRACTITIONER

## 2020-07-10 PROCEDURE — 80061 LIPID PANEL: CPT | Performed by: NURSE PRACTITIONER

## 2020-07-10 PROCEDURE — 84443 ASSAY THYROID STIM HORMONE: CPT | Performed by: NURSE PRACTITIONER

## 2020-07-10 PROCEDURE — 84439 ASSAY OF FREE THYROXINE: CPT | Performed by: NURSE PRACTITIONER

## 2020-07-11 LAB
T3 SERPL-MCNC: 106 NG/DL (ref 80–200)
T4 FREE SERPL-MCNC: 1.1 NG/DL (ref 0.93–1.7)
TSH SERPL DL<=0.05 MIU/L-ACNC: 1.1 UIU/ML (ref 0.27–4.2)

## 2020-07-13 ENCOUNTER — LAB (OUTPATIENT)
Dept: LAB | Facility: OTHER | Age: 50
End: 2020-07-13

## 2020-07-13 ENCOUNTER — OFFICE VISIT (OUTPATIENT)
Dept: FAMILY MEDICINE CLINIC | Facility: CLINIC | Age: 50
End: 2020-07-13

## 2020-07-13 VITALS
HEART RATE: 75 BPM | WEIGHT: 238 LBS | HEIGHT: 65 IN | RESPIRATION RATE: 18 BRPM | DIASTOLIC BLOOD PRESSURE: 72 MMHG | BODY MASS INDEX: 39.65 KG/M2 | OXYGEN SATURATION: 99 % | TEMPERATURE: 97.2 F | SYSTOLIC BLOOD PRESSURE: 124 MMHG

## 2020-07-13 DIAGNOSIS — E55.9 VITAMIN D DEFICIENCY: Primary | ICD-10-CM

## 2020-07-13 DIAGNOSIS — E78.2 MIXED HYPERLIPIDEMIA: ICD-10-CM

## 2020-07-13 DIAGNOSIS — E55.9 VITAMIN D DEFICIENCY: ICD-10-CM

## 2020-07-13 DIAGNOSIS — Z13.21 ENCOUNTER FOR VITAMIN DEFICIENCY SCREENING: ICD-10-CM

## 2020-07-13 DIAGNOSIS — R60.9 PERIPHERAL EDEMA: ICD-10-CM

## 2020-07-13 DIAGNOSIS — I10 ESSENTIAL HYPERTENSION: ICD-10-CM

## 2020-07-13 DIAGNOSIS — E66.09 CLASS 2 OBESITY DUE TO EXCESS CALORIES WITHOUT SERIOUS COMORBIDITY WITH BODY MASS INDEX (BMI) OF 39.0 TO 39.9 IN ADULT: ICD-10-CM

## 2020-07-13 LAB
25(OH)D3 SERPL-MCNC: 39.7 NG/ML (ref 30–100)
FOLATE SERPL-MCNC: >20 NG/ML (ref 4.78–24.2)
VIT B12 BLD-MCNC: 1105 PG/ML (ref 211–946)

## 2020-07-13 PROCEDURE — 99213 OFFICE O/P EST LOW 20 MIN: CPT | Performed by: NURSE PRACTITIONER

## 2020-07-13 PROCEDURE — 36415 COLL VENOUS BLD VENIPUNCTURE: CPT | Performed by: NURSE PRACTITIONER

## 2020-07-13 PROCEDURE — 82746 ASSAY OF FOLIC ACID SERUM: CPT | Performed by: NURSE PRACTITIONER

## 2020-07-13 PROCEDURE — 82607 VITAMIN B-12: CPT | Performed by: NURSE PRACTITIONER

## 2020-07-13 PROCEDURE — 82306 VITAMIN D 25 HYDROXY: CPT | Performed by: NURSE PRACTITIONER

## 2020-07-13 NOTE — PROGRESS NOTES
Chief Complaint   Patient presents with   • Hypertension     follow up   • Results     lab results     Subjective   Fidelina Dailey is a 49 y.o. female who presents to the office for routine follow up of BLE swelling, HTN and recent labs results from 7/10/2020.    The following portions of the patient's history were reviewed and updated as appropriate: allergies, current medications, past family history, past medical history, past social history, past surgical history and problem list.    History of Present Illness   7/10 /20 labs reviewed.  BLE swelling resolved, has seen EDITH Curtis CV surgery for workup. Taking HCTZ and walking more has resolved previous issues.  HTN: well controlled bp today 124/72 on lisinopril 20mg daily and HCTZ 25mg daily.  Hyperlipidmia: better labs with pravastatin 40mg. LDL still 131, but LDL/ HDL ratio normal. No family history of premature CAD. No personal history of CAD. Risk factors include obesity and hyperlipidemia. No smoking.  Vitamin D deficiency, taking OTC vitamin D 2,000 units daily, level needs repeated.  Obesity: Body mass index is 39.61 kg/m².   has lost 3 pounds since last visit with exercise.  Other labs are normal.    No new problems today.  Parts of most recent relevant visit HPI, ROS  and PE may be carried forward and all are updated as appropriate for current situation.    Past Medical History:   Diagnosis Date   • Anxiety    • Hyperlipidemia    • Hypertension    • Yeast infection           Family History   Problem Relation Age of Onset   • Hypertension Mother    • Cancer Mother    • No Known Problems Father    • Down syndrome Son    • Breast cancer Maternal Grandmother         Review of Systems   Constitutional: Negative.  Negative for fever and unexpected weight change.   HENT: Negative.    Eyes: Negative.    Respiratory: Negative.  Negative for cough, chest tightness and shortness of breath.    Cardiovascular: Negative.  Negative for chest pain.  "  Gastrointestinal: Negative.    Endocrine: Negative.    Genitourinary: Negative.  Negative for dysuria.   Musculoskeletal: Negative.    Skin: Negative.  Negative for color change, pallor, rash and wound.   Allergic/Immunologic: Negative.    Neurological: Negative.    Hematological: Negative.    Psychiatric/Behavioral: Negative.  Negative for sleep disturbance and suicidal ideas.   All other systems reviewed and are negative.      Objective   Vitals:    07/13/20 0758   BP: 124/72   BP Location: Left arm   Patient Position: Sitting   Pulse: 75   Resp: 18   Temp: 97.2 °F (36.2 °C)   SpO2: 99%   Weight: 108 kg (238 lb)   Height: 165.1 cm (65\")   PainSc: 0-No pain     Physical Exam   Constitutional: She is oriented to person, place, and time. No distress.   Pulmonary/Chest: Effort normal. No stridor. No respiratory distress.   Neurological: She is oriented to person, place, and time.   Psychiatric: She has a normal mood and affect. Her behavior is normal. Judgment and thought content normal.       Assessment/Plan   Fidelina was seen today for hypertension and results.    Diagnoses and all orders for this visit:    Vitamin D deficiency  -     Vitamin D 25 hydroxy; Future  -     Vitamin D 25 hydroxy; Future    Essential hypertension    Peripheral edema    Mixed hyperlipidemia  -     Lipid Panel; Future    Class 2 obesity due to excess calories without serious comorbidity with body mass index (BMI) of 39.0 to 39.9 in adult    Encounter for vitamin deficiency screening  -     Vitamin B12 & Folate           PHQ-2/PHQ-9 Depression Screening 3/6/2020   Little interest or pleasure in doing things 0   Feeling down, depressed, or hopeless 0   Trouble falling or staying asleep, or sleeping too much 0   Feeling tired or having little energy 0   Poor appetite or overeating 0   Feeling bad about yourself - or that you are a failure or have let yourself or your family down 0   Trouble concentrating on things, such as reading the " newspaper or watching television 0   Moving or speaking so slowly that other people could have noticed. Or the opposite - being so fidgety or restless that you have been moving around a lot more than usual 0   Thoughts that you would be better off dead, or of hurting yourself in some way 0   Total Score 0       Crystal L Alfaro, APRN         Return in about 6 months (around 1/13/2021).    There are no Patient Instructions on file for this visit.    Lab on 07/10/2020   Component Date Value Ref Range Status   • Total Cholesterol 07/10/2020 209* 150 - 200 mg/dL Final   • Triglycerides 07/10/2020 119  <=150 mg/dL Final   • HDL Cholesterol 07/10/2020 54  40 - 59 mg/dL Final   • LDL Cholesterol  07/10/2020 131* <=100 mg/dL Final   • VLDL Cholesterol 07/10/2020 23.8  mg/dL Final   • LDL/HDL Ratio 07/10/2020 2.43  0.00 - 3.22 Final   • Glucose 07/10/2020 84  70 - 99 mg/dL Final   • BUN 07/10/2020 17  7 - 23 mg/dL Final   • Creatinine 07/10/2020 0.74  0.52 - 1.04 mg/dL Final   • Sodium 07/10/2020 140  137 - 145 mmol/L Final   • Potassium 07/10/2020 3.7  3.4 - 5.0 mmol/L Final   • Chloride 07/10/2020 102  101 - 112 mmol/L Final   • CO2 07/10/2020 32.0* 22.0 - 30.0 mmol/L Final   • Calcium 07/10/2020 10.1  8.4 - 10.2 mg/dL Final   • Total Protein 07/10/2020 7.6  6.3 - 8.6 g/dL Final   • Albumin 07/10/2020 4.10  3.50 - 5.00 g/dL Final   • ALT (SGPT) 07/10/2020 20  <=35 U/L Final   • AST (SGOT) 07/10/2020 23  14 - 36 U/L Final   • Alkaline Phosphatase 07/10/2020 73  38 - 126 U/L Final   • Total Bilirubin 07/10/2020 0.5  0.2 - 1.3 mg/dL Final   • eGFR Non African Amer 07/10/2020 83  58 - 135 mL/min/1.73 Final   • Globulin 07/10/2020 3.5  2.3 - 3.5 gm/dL Final   • A/G Ratio 07/10/2020 1.2  1.1 - 1.8 g/dL Final   • BUN/Creatinine Ratio 07/10/2020 23.0  7.0 - 25.0 Final   • Anion Gap 07/10/2020 6.0  5.0 - 15.0 mmol/L Final   • Free T4 07/10/2020 1.10  0.93 - 1.70 ng/dL Final   • TSH 07/10/2020 1.100  0.270 - 4.200 uIU/mL Final   • T3,  Total 07/10/2020 106.0  80.0 - 200.0 ng/dl Final   • WBC 07/10/2020 7.22  3.40 - 10.80 10*3/mm3 Final   • RBC 07/10/2020 4.08  3.77 - 5.28 10*6/mm3 Final   • Hemoglobin 07/10/2020 12.7  12.0 - 15.9 g/dL Final   • Hematocrit 07/10/2020 38.6  34.0 - 46.6 % Final   • MCV 07/10/2020 94.6  79.0 - 97.0 fL Final   • MCH 07/10/2020 31.1  26.6 - 33.0 pg Final   • MCHC 07/10/2020 32.9  31.5 - 35.7 g/dL Final   • RDW 07/10/2020 13.7  12.3 - 15.4 % Final   • RDW-SD 07/10/2020 45.6  37.0 - 54.0 fl Final   • MPV 07/10/2020 11.6  6.0 - 12.0 fL Final   • Platelets 07/10/2020 246  140 - 450 10*3/mm3 Final   • Neutrophil % 07/10/2020 62.1  42.7 - 76.0 % Final   • Lymphocyte % 07/10/2020 24.9  19.6 - 45.3 % Final   • Monocyte % 07/10/2020 7.3  5.0 - 12.0 % Final   • Eosinophil % 07/10/2020 5.3  0.3 - 6.2 % Final   • Basophil % 07/10/2020 0.4  0.0 - 1.5 % Final   • Neutrophils, Absolute 07/10/2020 4.48  1.70 - 7.00 10*3/mm3 Final   • Lymphocytes, Absolute 07/10/2020 1.80  0.70 - 3.10 10*3/mm3 Final   • Monocytes, Absolute 07/10/2020 0.53  0.10 - 0.90 10*3/mm3 Final   • Eosinophils, Absolute 07/10/2020 0.38  0.00 - 0.40 10*3/mm3 Final   • Basophils, Absolute 07/10/2020 0.03  0.00 - 0.20 10*3/mm3 Final   ]

## 2020-07-27 DIAGNOSIS — E78.2 MIXED HYPERLIPIDEMIA: Primary | ICD-10-CM

## 2020-08-20 ENCOUNTER — OFFICE VISIT (OUTPATIENT)
Dept: FAMILY MEDICINE CLINIC | Facility: CLINIC | Age: 50
End: 2020-08-20

## 2020-08-20 VITALS
OXYGEN SATURATION: 98 % | SYSTOLIC BLOOD PRESSURE: 132 MMHG | WEIGHT: 242.8 LBS | DIASTOLIC BLOOD PRESSURE: 74 MMHG | BODY MASS INDEX: 40.45 KG/M2 | TEMPERATURE: 98.6 F | HEIGHT: 65 IN | HEART RATE: 68 BPM | RESPIRATION RATE: 18 BRPM

## 2020-08-20 DIAGNOSIS — K21.9 GASTROESOPHAGEAL REFLUX DISEASE, ESOPHAGITIS PRESENCE NOT SPECIFIED: Primary | ICD-10-CM

## 2020-08-20 DIAGNOSIS — R10.9 GASTRIC PAIN: ICD-10-CM

## 2020-08-20 DIAGNOSIS — R13.10 DYSPHAGIA, UNSPECIFIED TYPE: ICD-10-CM

## 2020-08-20 PROCEDURE — 99213 OFFICE O/P EST LOW 20 MIN: CPT | Performed by: NURSE PRACTITIONER

## 2020-08-20 RX ORDER — PANTOPRAZOLE SODIUM 40 MG/1
40 TABLET, DELAYED RELEASE ORAL DAILY
Qty: 90 TABLET | Refills: 1 | Status: SHIPPED | OUTPATIENT
Start: 2020-08-20 | End: 2020-08-27

## 2020-08-20 RX ORDER — SUCRALFATE 1 G/1
1 TABLET ORAL 4 TIMES DAILY
Qty: 120 TABLET | Refills: 5 | Status: SHIPPED | OUTPATIENT
Start: 2020-08-20 | End: 2022-02-11

## 2020-08-20 NOTE — PROGRESS NOTES
"Chief Complaint   Patient presents with   • Difficulty Swallowing     feels like heartburn- going on for about 2 weeks   • Heartburn     Subjective   Fidelina Dailey is a 49 y.o. female who presents to the office for trouble with heartburn and swallowing x 2 weeks.   The following portions of the patient's history were reviewed and updated as appropriate: allergies, current medications, past family history, past medical history, past social history, past surgical history and problem list.    History of Present Illness   Reports trouble with heartburn and feels like food gets \"stuck\" in esophagus, quite often over past 2 weeks. Not on any prescritoin H2 blocker or PPI- has been trying OTC antacids.  This swallowing difficulty is new.  No nausea or vomiting, no other associated symptoms   no other complaints today.   Parts of most recent relevant visit HPI, ROS  and PE may be carried forward and all are updated as appropriate for current situation.           Past Medical History:   Diagnosis Date   • Anxiety    • Hyperlipidemia    • Hypertension    • Yeast infection           Family History   Problem Relation Age of Onset   • Hypertension Mother    • Cancer Mother    • No Known Problems Father    • Down syndrome Son    • Breast cancer Maternal Grandmother         Review of Systems   Constitutional: Negative.  Negative for chills, fever and unexpected weight change.   HENT: Positive for trouble swallowing.    Eyes: Negative.    Respiratory: Negative.  Negative for cough, chest tightness and shortness of breath.    Cardiovascular: Negative.  Negative for chest pain.   Gastrointestinal: Negative.  Negative for abdominal distention, abdominal pain, constipation, diarrhea, nausea and vomiting.        Heartburn   Endocrine: Negative.    Genitourinary: Negative.  Negative for dysuria.   Musculoskeletal: Negative.    Skin: Negative.  Negative for color change, pallor, rash and wound.   Allergic/Immunologic: Negative.  " "  Neurological: Negative.    Hematological: Negative.    Psychiatric/Behavioral: Negative.  Negative for sleep disturbance and suicidal ideas.   All other systems reviewed and are negative.      Objective   Vitals:    08/20/20 0957   BP: 132/74   BP Location: Left arm   Patient Position: Sitting   Pulse: 68   Resp: 18   Temp: 98.6 °F (37 °C)   SpO2: 98%   Weight: 110 kg (242 lb 12.8 oz)   Height: 165.1 cm (65\")   PainSc: 0-No pain     Physical Exam   Constitutional: She is oriented to person, place, and time. She appears well-developed and well-nourished. No distress.   HENT:   Head: Normocephalic and atraumatic.   Eyes: Pupils are equal, round, and reactive to light. Conjunctivae are normal.   Neck: Normal range of motion. Neck supple.   Cardiovascular: Normal rate, regular rhythm, normal heart sounds and intact distal pulses. Exam reveals no gallop and no friction rub.   No murmur heard.  Pulmonary/Chest: Effort normal and breath sounds normal. No stridor. No respiratory distress. She has no wheezes. She has no rales. She exhibits no tenderness.   Abdominal: Soft. Bowel sounds are normal.   Musculoskeletal: Normal range of motion. She exhibits no edema, tenderness or deformity.   Lymphadenopathy:     She has no cervical adenopathy.   Neurological: She is alert and oriented to person, place, and time.   Skin: Skin is warm and dry. Capillary refill takes 2 to 3 seconds. No erythema. No pallor.   Psychiatric: She has a normal mood and affect. Her behavior is normal. Judgment and thought content normal.   Nursing note and vitals reviewed.      Assessment/Plan   Fidelina was seen today for difficulty swallowing and heartburn.    Diagnoses and all orders for this visit:    Gastroesophageal reflux disease, esophagitis presence not specified  -     Discontinue: pantoprazole (PROTONIX) 40 MG EC tablet; Take 1 tablet by mouth Daily.  -     Ambulatory Referral to Gastroenterology  -     sucralfate (CARAFATE) 1 g tablet; Take 1 " tablet by mouth 4 (Four) Times a Day. Dissolve in an ounce of water to take dose.    Dysphagia, unspecified type  -     Ambulatory Referral to Gastroenterology    Gastric pain  -     sucralfate (CARAFATE) 1 g tablet; Take 1 tablet by mouth 4 (Four) Times a Day. Dissolve in an ounce of water to take dose.         New onset dysphagia, esophageal. New onset of persistent heartburn symptoms. New prescription for carafate and protonix for symptoms and referral to GI for evaluation of GERD as well as dysphagia.  PHQ-2/PHQ-9 Depression Screening 3/6/2020   Little interest or pleasure in doing things 0   Feeling down, depressed, or hopeless 0   Trouble falling or staying asleep, or sleeping too much 0   Feeling tired or having little energy 0   Poor appetite or overeating 0   Feeling bad about yourself - or that you are a failure or have let yourself or your family down 0   Trouble concentrating on things, such as reading the newspaper or watching television 0   Moving or speaking so slowly that other people could have noticed. Or the opposite - being so fidgety or restless that you have been moving around a lot more than usual 0   Thoughts that you would be better off dead, or of hurting yourself in some way 0   Total Score 0       Crystal L Alfaro, EDITH         Return in about 3 months (around 11/20/2020), or if symptoms worsen or fail to improve.    There are no Patient Instructions on file for this visit.

## 2020-08-27 ENCOUNTER — OFFICE VISIT (OUTPATIENT)
Dept: GASTROENTEROLOGY | Facility: CLINIC | Age: 50
End: 2020-08-27

## 2020-08-27 VITALS
HEART RATE: 74 BPM | HEIGHT: 65 IN | BODY MASS INDEX: 39.75 KG/M2 | WEIGHT: 238.6 LBS | SYSTOLIC BLOOD PRESSURE: 113 MMHG | DIASTOLIC BLOOD PRESSURE: 72 MMHG

## 2020-08-27 DIAGNOSIS — K21.00 GASTROESOPHAGEAL REFLUX DISEASE WITH ESOPHAGITIS: Primary | ICD-10-CM

## 2020-08-27 DIAGNOSIS — Z12.11 ENCOUNTER FOR SCREENING FOR MALIGNANT NEOPLASM OF COLON: ICD-10-CM

## 2020-08-27 DIAGNOSIS — R13.19 ESOPHAGEAL DYSPHAGIA: ICD-10-CM

## 2020-08-27 PROBLEM — E66.01 MORBID OBESITY: Status: ACTIVE | Noted: 2020-02-16

## 2020-08-27 PROCEDURE — 99214 OFFICE O/P EST MOD 30 MIN: CPT | Performed by: NURSE PRACTITIONER

## 2020-08-27 RX ORDER — DEXTROSE AND SODIUM CHLORIDE 5; .45 G/100ML; G/100ML
30 INJECTION, SOLUTION INTRAVENOUS CONTINUOUS PRN
Status: CANCELLED | OUTPATIENT
Start: 2020-08-27

## 2020-08-27 RX ORDER — OMEPRAZOLE 40 MG/1
40 CAPSULE, DELAYED RELEASE ORAL DAILY
Qty: 30 CAPSULE | Refills: 11 | Status: SHIPPED | OUTPATIENT
Start: 2020-08-27 | End: 2022-02-11 | Stop reason: SDUPTHER

## 2020-08-27 NOTE — PROGRESS NOTES
Chief Complaint   Patient presents with   • Heartburn   • Difficulty Swallowing       Subjective    Fidelina Dailey is a 49 y.o. female. she is here today for follow-up.  49-year-old female presents to discuss her reflux and dysphasia that started about 2 to 3 weeks ago and has progressively worsened.  States she had similar issue about 2 years ago.  Reports reflux and nausea she was prescribed Protonix and Carafate but reports she went to pharmacy to  it was about $90 and she could not afford it.  She also has never had screening colonoscopy she denies any change in bowel habits or blood within her stool.  She reports family history of colon cancer in a maternal aunt.    Heartburn   She complains of abdominal pain, dysphagia, globus sensation and heartburn. She reports no belching, no chest pain, no choking, no coughing, no early satiety, no hoarse voice or no nausea. This is a chronic problem. The current episode started more than 1 year ago. The problem occurs frequently. The problem has been waxing and waning. The heartburn duration is less than a minute. The heartburn is located in the substernum. Risk factors include obesity.   Difficulty Swallowing   Associated symptoms include abdominal pain. Pertinent negatives include no chest pain, coughing or nausea.     Plan; schedule patient for EGD due to dysphagia and reflux.  Schedule patient for initial screening colonoscopy.       The following portions of the patient's history were reviewed and updated as appropriate:   Past Medical History:   Diagnosis Date   • Anxiety    • Hyperlipidemia    • Hypertension    • Yeast infection      Past Surgical History:   Procedure Laterality Date   • BILATERAL BREAST REDUCTION  2018   • EXPLORATORY LAPAROTOMY     • GALLBLADDER SURGERY     • GASTRIC SLEEVE LAPAROSCOPIC     • KIDNEY STONE SURGERY  2017   • SKIN SURGERY     • TUBAL ABDOMINAL LIGATION       Family History   Problem Relation Age of Onset   • Hypertension  Mother    • Cancer Mother    • No Known Problems Father    • Down syndrome Son    • Breast cancer Maternal Grandmother      OB History        4    Para   4    Term                AB        Living           SAB        TAB        Ectopic        Molar        Multiple        Live Births                  Prior to Admission medications    Medication Sig Start Date End Date Taking? Authorizing Provider   calcium carbonate-vitamin d 600-400 MG-UNIT per tablet Take 1 tablet by mouth.   Yes Emergency, Nurse Abel RN   Cholecalciferol 4000 units capsule Take 400 Units by mouth.   Yes Emergency, Nurse Abel RN   cyanocobalamin ([employer] VITAMIN B-12) 1000 MCG tablet Take 1 tablet by mouth.   Yes Emergency, Nurse RIANA Roman   hydroCHLOROthiazide (HYDRODIURIL) 25 MG tablet Take 1 tablet by mouth Daily. 19  Yes Alfaro, EDITH Block   ibuprofen (ADVIL,MOTRIN) 800 MG tablet Take 800 mg by mouth Every 6 (Six) Hours As Needed. for pain 4/10/19  Yes Emergency, Nurse Abel RN   lisinopril (PRINIVIL,ZESTRIL) 20 MG tablet TAKE ONE TABLET BY MOUTH DAILY 20  Yes Dominic, EDITH Block   Multiple Vitamins-Minerals (ONE-A-DAY WOMENS VITACRAVES PO) Take  by mouth.   Yes Emergency, Nurse Abel RN   pravastatin (PRAVACHOL) 40 MG tablet Take 1 tablet by mouth Daily. Dose increased 3/9 3/9/20  Yes Dixie Alfaro APRN   triamcinolone (KENALOG) 0.1 % ointment Apply  topically to the appropriate area as directed Daily. 20  Yes Dixie Alfaro APRN   pantoprazole (PROTONIX) 40 MG EC tablet Take 1 tablet by mouth Daily. 20   Dixie Alfaro APRN   sucralfate (CARAFATE) 1 g tablet Take 1 tablet by mouth 4 (Four) Times a Day. Dissolve in an ounce of water to take dose. 20   Dixie Alfaro APRN     No Known Allergies  Social History     Socioeconomic History   • Marital status:      Spouse name: Not on file   • Number of children: Not on file   • Years of education: Not on file   • Highest  "education level: Not on file   Tobacco Use   • Smoking status: Never Smoker   • Smokeless tobacco: Never Used   Substance and Sexual Activity   • Alcohol use: No   • Drug use: No   • Sexual activity: Yes     Partners: Male     Birth control/protection: Tubal ligation       Review of Systems  Review of Systems   HENT: Negative for hoarse voice.    Respiratory: Negative for cough and choking.    Cardiovascular: Negative for chest pain.   Gastrointestinal: Positive for abdominal pain, dysphagia and heartburn. Negative for nausea.        /72 (BP Location: Right arm)   Pulse 74   Ht 165.1 cm (65\")   Wt 108 kg (238 lb 9.6 oz)   BMI 39.71 kg/m²     Objective    Physical Exam   Constitutional: She is oriented to person, place, and time. She appears well-developed and well-nourished. She is cooperative. No distress.   HENT:   Head: Normocephalic and atraumatic.   Neck: Normal range of motion. Neck supple. No thyromegaly present.   Cardiovascular: Normal rate, regular rhythm and normal heart sounds.   Pulmonary/Chest: Effort normal and breath sounds normal. She has no wheezes. She has no rhonchi. She has no rales.   Abdominal: Soft. Normal appearance and bowel sounds are normal. She exhibits no distension. There is no hepatosplenomegaly. There is tenderness in the epigastric area. There is no rigidity and no guarding. No hernia.   Lymphadenopathy:     She has no cervical adenopathy.   Neurological: She is alert and oriented to person, place, and time.   Skin: Skin is warm, dry and intact. No rash noted. No pallor.   Psychiatric: She has a normal mood and affect. Her speech is normal.     Lab on 07/13/2020   Component Date Value Ref Range Status   • 25 Hydroxy, Vitamin D 07/13/2020 39.7  30.0 - 100.0 ng/ml Final     Assessment/Plan      1. Gastroesophageal reflux disease with esophagitis    2. Esophageal dysphagia    3. Encounter for screening for malignant neoplasm of colon    .       Orders placed during this " encounter include:  Orders Placed This Encounter   Procedures   • Follow Anesthesia Guidelines / Standing Orders     Standing Status:   Future   • Obtain Informed Consent     Standing Status:   Future     Order Specific Question:   Informed Consent Given For     Answer:   ESOPHAGOGASTRODUODENOSCOPY and Colonoscopy       ESOPHAGOGASTRODUODENOSCOPY FRI appt (N/A), COLONOSCOPY (N/A)    Review and/or summary of lab tests, radiology, procedures, medications. Review and summary of old records and obtaining of history. The risks and benefits of my recommendations, as well as other treatment options were discussed with the patient today. Questions were answered.    New Medications Ordered This Visit   Medications   • omeprazole (priLOSEC) 40 MG capsule     Sig: Take 1 capsule by mouth Daily.     Dispense:  30 capsule     Refill:  11   • polyethylene glycol (GoLYTELY) 236 g solution     Sig: Starting at noon on day prior to procedure, drink 8 ounces every 30 minutes until all gone or stools are clear. May add flavor packet.     Dispense:  4000 mL     Refill:  0       Follow-up: Return in about 4 weeks (around 9/24/2020) for After test, Recheck.          This document has been electronically signed by EDITH Antunez on August 27, 2020 09:32             Results for orders placed or performed in visit on 07/13/20   Vitamin D 25 hydroxy   Result Value Ref Range    25 Hydroxy, Vitamin D 39.7 30.0 - 100.0 ng/ml   Results for orders placed or performed in visit on 07/13/20   Vitamin B12 & Folate   Result Value Ref Range    Folate >20.00 4.78 - 24.20 ng/mL    Vitamin B-12 1,105 (H) 211 - 946 pg/mL   Results for orders placed or performed in visit on 07/10/20   CBC Auto Differential   Result Value Ref Range    WBC 7.22 3.40 - 10.80 10*3/mm3    RBC 4.08 3.77 - 5.28 10*6/mm3    Hemoglobin 12.7 12.0 - 15.9 g/dL    Hematocrit 38.6 34.0 - 46.6 %    MCV 94.6 79.0 - 97.0 fL    MCH 31.1 26.6 - 33.0 pg    MCHC 32.9 31.5 - 35.7 g/dL    RDW  13.7 12.3 - 15.4 %    RDW-SD 45.6 37.0 - 54.0 fl    MPV 11.6 6.0 - 12.0 fL    Platelets 246 140 - 450 10*3/mm3    Neutrophil % 62.1 42.7 - 76.0 %    Lymphocyte % 24.9 19.6 - 45.3 %    Monocyte % 7.3 5.0 - 12.0 %    Eosinophil % 5.3 0.3 - 6.2 %    Basophil % 0.4 0.0 - 1.5 %    Neutrophils, Absolute 4.48 1.70 - 7.00 10*3/mm3    Lymphocytes, Absolute 1.80 0.70 - 3.10 10*3/mm3    Monocytes, Absolute 0.53 0.10 - 0.90 10*3/mm3    Eosinophils, Absolute 0.38 0.00 - 0.40 10*3/mm3    Basophils, Absolute 0.03 0.00 - 0.20 10*3/mm3   T3   Result Value Ref Range    T3, Total 106.0 80.0 - 200.0 ng/dl   TSH   Result Value Ref Range    TSH 1.100 0.270 - 4.200 uIU/mL   T4, Free   Result Value Ref Range    Free T4 1.10 0.93 - 1.70 ng/dL   Lipid Panel   Result Value Ref Range    Total Cholesterol 209 (H) 150 - 200 mg/dL    Triglycerides 119 <=150 mg/dL    HDL Cholesterol 54 40 - 59 mg/dL    LDL Cholesterol  131 (H) <=100 mg/dL    VLDL Cholesterol 23.8 mg/dL    LDL/HDL Ratio 2.43 0.00 - 3.22   Comprehensive Metabolic Panel   Result Value Ref Range    Glucose 84 70 - 99 mg/dL    BUN 17 7 - 23 mg/dL    Creatinine 0.74 0.52 - 1.04 mg/dL    Sodium 140 137 - 145 mmol/L    Potassium 3.7 3.4 - 5.0 mmol/L    Chloride 102 101 - 112 mmol/L    CO2 32.0 (H) 22.0 - 30.0 mmol/L    Calcium 10.1 8.4 - 10.2 mg/dL    Total Protein 7.6 6.3 - 8.6 g/dL    Albumin 4.10 3.50 - 5.00 g/dL    ALT (SGPT) 20 <=35 U/L    AST (SGOT) 23 14 - 36 U/L    Alkaline Phosphatase 73 38 - 126 U/L    Total Bilirubin 0.5 0.2 - 1.3 mg/dL    eGFR Non  Amer 83 58 - 135 mL/min/1.73    Globulin 3.5 2.3 - 3.5 gm/dL    A/G Ratio 1.2 1.1 - 1.8 g/dL    BUN/Creatinine Ratio 23.0 7.0 - 25.0    Anion Gap 6.0 5.0 - 15.0 mmol/L   Results for orders placed or performed in visit on 03/06/20   ToxASSURE Select 13 Discrete -   Result Value Ref Range    Report Summary FINAL     Ethanol Screen Urine (Ref) Negative     Ethanol, Urine Not Detected g/dL    Amphetamine, Urine Qual Negative      Methamphetamine, Urine Not Detected ng/mg creat    Amphetamine, Urine Not Detected ng/mg creat    MDMA URINE Not Detected ng/mg creat    MDA Not Detected ng/mg creat    Benzodiazepine Screen, Urine Negative     DIAZEPAM URINE QUANT (REF) Not Detected ng/mg creat    Desmethyldiazepam Not Detected ng/mg creat    Oxazepam, urine Not Detected ng/mg creat    Temazepam, urine Not Detected ng/mg creat    ALPRAZOLAM Not Detected ng/mg creat    ALPHA-HYDROXYALPRAZOLAM UR, QT (REF) Not Detected ng/mg creat    DESALKLFLURAZEPAM UR QUANT (REF) Not Detected ng/mg creat    LORAZEPAM URINE QUANT (REF) Not Detected ng/mg creat    Alpha-hydroxytriazolam, Urine Not Detected ng/mg creat    Clonazepam Urine Not Detected ng/mg creat    7-Aminoclonazepam Urine Not Detected ng/mg creat    MIDAZOLAM UR, QUANT (REF) Not Detected ng/mg creat    Alpha-hydroxymidazolam, Urine Not Detected ng/mg creat    Flunitrazepam Not Detected ng/mg creat    DESMETHYLFLUNITRAZEPAM Not Detected ng/mg creat    Cocaine & Metabolite Negative     Cocaine Screen, Urine Not Detected ng/mg creat    Benzoylecgonine, Urine Not Detected ng/mg creat    Cocaethylene Ur Not Detected ng/mg creat    THC, Urine Negative     Carboxy THC, Urine Not Detected ng/mg creat    6-ACETYLMORPHINE Negative     6-acetylmorphine Not Detected ng/mg creat    Opiate Class Negative     Codeine, Urine Not Detected ng/mg creat    Morphine, Urine Not Detected ng/mg creat    normorphine Not Detected ng/mg creat    Norcodeine Ur Not Detected ng/mg creat    Hydrocodone UR Not Detected ng/mg creat    Hydromorphone Urine Not Detected ng/mg creat    Dihydrocodeine, Urine Not Detected ng/mg creat    Opiates, Norhydrocodone, Urine Not Detected ng/mg creat    Oxycodone Class Ur Negative     Oxycodone, Confirmation, Urine Not Detected ng/mg creat    Oxymorphone UR Not Detected ng/mg creat    Opiates, Noroxycodone, Urine Not Detected ng/mg creat    Noroxymorphone Not Detected ng/mg creat    Methadone  Negative     Methadone, Quantitative Not Detected ng/mg creat    EDDP Not Detected ng/mg creat    Fentanyl and Analogues, Ur Negative     Fentanyl, Urine Not Detected ng/mg creat    Norfentanyl Urine Not Detected ng/mg creat    Sufentanil, Ur Not Detected ng/mg creat    Alfentanil, Ur Not Detected ng/mg creat    BUPRENORPHINE Negative     Buprenorphine, Urine Not Detected ng/mg creat    Norbuprenorphine Not Detected ng/mg creat    Tapentadol Negative     Tapentadol Ur Not Detected ng/mg creat    Opoidss other, UR Negative     Tramadol, Urine Not Detected ng/mg creat    O-desmethyltramadol, Ur Not Detected ng/mg creat    N-Desmethyltramadol, U Not Detected ng/mg creat    BARBITURATES, URINE Negative     Amobarbital, Urine Not Detected     Barbital Not Detected     Butabarbital, Ur Not Detected     Butalbital Screen, Urine Not Detected     Mephobarbital Urine Not Detected     Pentobarbital UR Not Detected     Phenobarbital Not Detected     Secobarbital, urine Not Detected     Thiopental, Ur Not Detected     Creatinine, Urine 57 mg/dL    Level of Detection: Comment      *Note: Due to a large number of results and/or encounters for the requested time period, some results have not been displayed. A complete set of results can be found in Results Review.

## 2020-09-01 DIAGNOSIS — E78.00 HYPERCHOLESTEREMIA: ICD-10-CM

## 2020-09-02 RX ORDER — PRAVASTATIN SODIUM 40 MG
TABLET ORAL
Qty: 90 TABLET | Refills: 1 | Status: SHIPPED | OUTPATIENT
Start: 2020-09-02 | End: 2020-11-23 | Stop reason: SDUPTHER

## 2020-11-18 ENCOUNTER — LAB (OUTPATIENT)
Dept: LAB | Facility: OTHER | Age: 50
End: 2020-11-18

## 2020-11-18 DIAGNOSIS — E78.2 MIXED HYPERLIPIDEMIA: ICD-10-CM

## 2020-11-18 DIAGNOSIS — E55.9 VITAMIN D DEFICIENCY: ICD-10-CM

## 2020-11-18 LAB
25(OH)D3 SERPL-MCNC: 36.7 NG/ML (ref 30–100)
CHOLEST SERPL-MCNC: 192 MG/DL (ref 150–200)
HDLC SERPL-MCNC: 52 MG/DL (ref 40–59)
LDLC SERPL CALC-MCNC: 121 MG/DL
LDLC/HDLC SERPL: 2.28 {RATIO} (ref 0–3.22)
TRIGL SERPL-MCNC: 106 MG/DL
VLDLC SERPL-MCNC: 19 MG/DL (ref 5–40)

## 2020-11-18 PROCEDURE — 82306 VITAMIN D 25 HYDROXY: CPT | Performed by: NURSE PRACTITIONER

## 2020-11-18 PROCEDURE — 36415 COLL VENOUS BLD VENIPUNCTURE: CPT | Performed by: NURSE PRACTITIONER

## 2020-11-18 PROCEDURE — 80061 LIPID PANEL: CPT | Performed by: NURSE PRACTITIONER

## 2020-11-23 ENCOUNTER — OFFICE VISIT (OUTPATIENT)
Dept: FAMILY MEDICINE CLINIC | Facility: CLINIC | Age: 50
End: 2020-11-23

## 2020-11-23 DIAGNOSIS — B35.1 TOENAIL FUNGUS: ICD-10-CM

## 2020-11-23 DIAGNOSIS — E78.00 HYPERCHOLESTEREMIA: ICD-10-CM

## 2020-11-23 DIAGNOSIS — E55.9 VITAMIN D DEFICIENCY: ICD-10-CM

## 2020-11-23 DIAGNOSIS — E53.8 B12 DEFICIENCY: ICD-10-CM

## 2020-11-23 DIAGNOSIS — M79.672 CHRONIC FOOT PAIN, LEFT: Primary | ICD-10-CM

## 2020-11-23 DIAGNOSIS — I10 ESSENTIAL HYPERTENSION: ICD-10-CM

## 2020-11-23 DIAGNOSIS — G89.29 CHRONIC FOOT PAIN, LEFT: Primary | ICD-10-CM

## 2020-11-23 PROCEDURE — 99442 PR PHYS/QHP TELEPHONE EVALUATION 11-20 MIN: CPT | Performed by: NURSE PRACTITIONER

## 2020-11-23 RX ORDER — PRAVASTATIN SODIUM 40 MG
60 TABLET ORAL DAILY
Qty: 135 TABLET | Refills: 1 | Status: SHIPPED | OUTPATIENT
Start: 2020-11-23 | End: 2021-09-14 | Stop reason: SDUPTHER

## 2020-11-23 NOTE — PROGRESS NOTES
Chief Complaint   Patient presents with   • Foot Pain     Subjective   Fidelina Dailey is a 49 y.o. female who presents to the office by telephone visit due to Covid 19 pandemic for left foot pain.     The following portions of the patient's history were reviewed and updated as appropriate: allergies, current medications, past family history, past medical history, past social history, past surgical history and problem list.    History of Present Illness   You have chosen to receive care through a telephone visit. Do you consent to use a telephone visit for your medical care today? Yes  12 minutes medical discussion.     New complaint left foot pain at  metatarsal phalangeal joint of 4th and 5th toes. No trauma, no redness, no swelling, no lesions. Gradual onset about a month ago. 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 Also has some toenail fungal infection with discoloration and thickened nails of left great and 2nd toenails. Has failed all available OTC treatments. Agreeable to podiatry referral, xray left foot ordered.     Parts of most recent relevant visit HPI, ROS  and PE may be carried forward and all are updated as appropriate for current situation.    Past Medical History:   Diagnosis Date   • Anxiety    • Hyperlipidemia    • Hypertension    • Yeast infection           Family History   Problem Relation Age of Onset   • Hypertension Mother    • Cancer Mother    • No Known Problems Father    • Down syndrome Son    • Breast cancer Maternal Grandmother         Review of Systems   Constitutional: Negative.  Negative for chills, fever and unexpected weight change.   HENT: Positive for trouble swallowing.    Eyes: Negative.    Respiratory: Negative.  Negative for cough, chest tightness and shortness of breath.    Cardiovascular: Negative.  Negative for chest pain.   Gastrointestinal: Negative.  Negative for abdominal distention, abdominal pain,  constipation, diarrhea, nausea and vomiting.        Heartburn   Endocrine: Negative.    Genitourinary: Negative.  Negative for dysuria.   Musculoskeletal: Positive for arthralgias.   Skin: Negative.  Negative for color change, pallor, rash and wound.   Allergic/Immunologic: Negative.    Neurological: Negative.    Hematological: Negative.    Psychiatric/Behavioral: Negative.  Negative for sleep disturbance and suicidal ideas.   All other systems reviewed and are negative.      Objective   Vitals:    11/23/20 0949   PainSc:   6   PainLoc: Foot     Physical Exam  Vitals signs and nursing note reviewed.   Constitutional:       General: She is not in acute distress.  Pulmonary:      Effort: Pulmonary effort is normal. No respiratory distress.      Breath sounds: No stridor.      Comments: Speak in full sentences without breathlessness.  No spontaneous cough during conversation.  Neurological:      Mental Status: She is alert and oriented to person, place, and time.   Psychiatric:         Mood and Affect: Mood normal.         Behavior: Behavior normal.         Thought Content: Thought content normal.         Judgment: Judgment normal.         Assessment/Plan   Diagnoses and all orders for this visit:    1. Chronic foot pain, left (Primary)  -     XR Foot 3+ View Left; Future  -     Ambulatory Referral to Podiatry    2. Toenail fungus  -     Ambulatory Referral to Podiatry    3. Hypercholesteremia  -     pravastatin (PRAVACHOL) 40 MG tablet; Take 1.5 tablets by mouth Daily.  Dispense: 135 tablet; Refill: 1  -     Lipid Panel; Future    4. Vitamin D deficiency  -     Vitamin D 25 Hydroxy; Future    5. B12 deficiency  -     Vitamin B12 & Folate; Future    6. Essential hypertension  -     CBC & Differential; Future  -     Comprehensive Metabolic Panel; Future         New complaint left foot pain at MTP joint fourth and fifth toes.  Also chronic toenail fungus with thickening of nails 1 and 2 on left foot.  Referral to  podiatry, x-ray left foot ordered.  Pain level at this time is 6 out of 10 left foot, but reports has been up at work all night long and she is about to go to bed middle feel better because she is off her foot.  Due for routine labs to be repeated in February orders are placed.  Reviewed labs from November 18: Vitamin D and lipids.  LDL not to goal on pravastatin 40 mg, goal is less than 100.  Dose increased to 60 mg p.o. daily new prescription sent.  Vitamin D level now normal with current replacement 4000 units over-the-counter vitamin D3 taken daily-advised to increase this on 3 days/week to 2/day as level is 36 and she will feel better if it is setting around 60-80.  PHQ-2/PHQ-9 Depression Screening 3/6/2020   Little interest or pleasure in doing things 0   Feeling down, depressed, or hopeless 0   Trouble falling or staying asleep, or sleeping too much 0   Feeling tired or having little energy 0   Poor appetite or overeating 0   Feeling bad about yourself - or that you are a failure or have let yourself or your family down 0   Trouble concentrating on things, such as reading the newspaper or watching television 0   Moving or speaking so slowly that other people could have noticed. Or the opposite - being so fidgety or restless that you have been moving around a lot more than usual 0   Thoughts that you would be better off dead, or of hurting yourself in some way 0   Total Score 0       EDITH Graham         Return in about 3 months (around 2/23/2021).    There are no Patient Instructions on file for this visit.

## 2020-12-02 ENCOUNTER — OFFICE VISIT (OUTPATIENT)
Dept: PODIATRY | Facility: CLINIC | Age: 50
End: 2020-12-02

## 2020-12-02 VITALS — HEIGHT: 65 IN | HEART RATE: 80 BPM | WEIGHT: 246 LBS | OXYGEN SATURATION: 99 % | BODY MASS INDEX: 40.98 KG/M2

## 2020-12-02 DIAGNOSIS — M20.11 HALLUX VALGUS OF RIGHT FOOT: ICD-10-CM

## 2020-12-02 DIAGNOSIS — B35.1 ONYCHOMYCOSIS: ICD-10-CM

## 2020-12-02 DIAGNOSIS — M79.671 RIGHT FOOT PAIN: Primary | ICD-10-CM

## 2020-12-02 DIAGNOSIS — M20.12 HALLUX VALGUS OF LEFT FOOT: ICD-10-CM

## 2020-12-02 DIAGNOSIS — M79.672 LEFT FOOT PAIN: ICD-10-CM

## 2020-12-02 DIAGNOSIS — M77.42 METATARSALGIA, LEFT FOOT: ICD-10-CM

## 2020-12-02 DIAGNOSIS — M77.8 CAPSULITIS OF FOOT, LEFT: ICD-10-CM

## 2020-12-02 PROCEDURE — 99204 OFFICE O/P NEW MOD 45 MIN: CPT | Performed by: PODIATRIST

## 2020-12-02 PROCEDURE — 11755 BIOPSY NAIL UNIT: CPT | Performed by: PODIATRIST

## 2020-12-02 PROCEDURE — 20600 DRAIN/INJ JOINT/BURSA W/O US: CPT | Performed by: PODIATRIST

## 2020-12-02 RX ORDER — MELOXICAM 15 MG/1
15 TABLET ORAL DAILY
Qty: 30 TABLET | Refills: 0 | Status: SHIPPED | OUTPATIENT
Start: 2020-12-02 | End: 2022-02-11

## 2020-12-02 RX ORDER — BUPIVACAINE HYDROCHLORIDE 5 MG/ML
1 INJECTION, SOLUTION PERINEURAL ONCE
Status: COMPLETED | OUTPATIENT
Start: 2020-12-02 | End: 2020-12-02

## 2020-12-02 RX ORDER — DEXAMETHASONE SODIUM PHOSPHATE 4 MG/ML
2 INJECTION, SOLUTION INTRA-ARTICULAR; INTRALESIONAL; INTRAMUSCULAR; INTRAVENOUS; SOFT TISSUE ONCE
Status: COMPLETED | OUTPATIENT
Start: 2020-12-02 | End: 2020-12-02

## 2020-12-02 RX ADMIN — BUPIVACAINE HYDROCHLORIDE 1 ML: 5 INJECTION, SOLUTION PERINEURAL at 10:10

## 2020-12-02 RX ADMIN — DEXAMETHASONE SODIUM PHOSPHATE 2 MG: 4 INJECTION, SOLUTION INTRA-ARTICULAR; INTRALESIONAL; INTRAMUSCULAR; INTRAVENOUS; SOFT TISSUE at 10:10

## 2020-12-02 NOTE — PROGRESS NOTES
Fidelina Dailey  1970  49 y.o. female     Patient presents to clinic today with the complaint of bilateral bunions and nail problem on left hallux, second and 5th toes.     12/02/2020    Chief Complaint   Patient presents with   • Left Foot - Pain, Bunions, Nail Problem   • Right Foot - Pain, Bunions       History of Present Illness    Fidelina Dailey is a 49 y.o.female who presents to clinic today with chief complaint of foot pain.  Her chief complaint today is of left lateral foot pain.  Complains of pain to the ball of the foot behind the fourth and fifth toes.  Pain is been present for greater than 1 month.  She rates as a 5 out of 10.  She scribes as sharp, achy, radiating.  Pain is worse with prolonged weightbearing at work on concrete.  There are no associated injuries or trauma.  She has done nothing to treat it.  She also relates to bunion deformities on both feet.  She states that the right foot bunion is worse than the left and hurts more in shoe gear.  Pain is constant with close toed shoes.  Pain is relieved with rest and open toed shoes.  Lastly she complains of thickened and discolored toenails.  She has been using over-the-counter antifungal treatments which have not helped.  She has done nothing else to treat the toenail fungus.  She has no other complaints today.    Past Medical History:   Diagnosis Date   • Anxiety    • Bunion    • Hyperlipidemia    • Hypertension    • Verruca    • Yeast infection          Past Surgical History:   Procedure Laterality Date   • BILATERAL BREAST REDUCTION  2018   • EXPLORATORY LAPAROTOMY     • GALLBLADDER SURGERY     • GASTRIC SLEEVE LAPAROSCOPIC     • KIDNEY STONE SURGERY  2017   • SKIN SURGERY     • TUBAL ABDOMINAL LIGATION           Family History   Problem Relation Age of Onset   • Hypertension Mother    • Cancer Mother    • Rashes / Skin problems Mother    • No Known Problems Father    • Down syndrome Son    • Breast cancer Maternal Grandmother             No Known Allergies    Social History     Socioeconomic History   • Marital status:      Spouse name: Not on file   • Number of children: Not on file   • Years of education: Not on file   • Highest education level: Not on file   Tobacco Use   • Smoking status: Never Smoker   • Smokeless tobacco: Never Used   Substance and Sexual Activity   • Alcohol use: No   • Drug use: No   • Sexual activity: Yes     Partners: Male     Birth control/protection: Tubal ligation         Current Outpatient Medications   Medication Sig Dispense Refill   • calcium carbonate-vitamin d 600-400 MG-UNIT per tablet Take 1 tablet by mouth.     • Cholecalciferol 4000 units capsule Take 400 Units by mouth.     • cyanocobalamin ([employer] VITAMIN B-12) 1000 MCG tablet Take 1 tablet by mouth.     • hydroCHLOROthiazide (HYDRODIURIL) 25 MG tablet Take 1 tablet by mouth Daily. 90 tablet 3   • ibuprofen (ADVIL,MOTRIN) 800 MG tablet Take 800 mg by mouth Every 6 (Six) Hours As Needed. for pain  0   • lisinopril (PRINIVIL,ZESTRIL) 20 MG tablet TAKE ONE TABLET BY MOUTH DAILY 30 tablet 5   • Multiple Vitamins-Minerals (ONE-A-DAY WOMENS VITACRAVES PO) Take  by mouth.     • omeprazole (priLOSEC) 40 MG capsule Take 1 capsule by mouth Daily. 30 capsule 11   • polyethylene glycol (GoLYTELY) 236 g solution Starting at noon on day prior to procedure, drink 8 ounces every 30 minutes until all gone or stools are clear. May add flavor packet. 4000 mL 0   • pravastatin (PRAVACHOL) 40 MG tablet Take 1.5 tablets by mouth Daily. 135 tablet 1   • sucralfate (CARAFATE) 1 g tablet Take 1 tablet by mouth 4 (Four) Times a Day. Dissolve in an ounce of water to take dose. 120 tablet 5   • triamcinolone (KENALOG) 0.1 % ointment Apply  topically to the appropriate area as directed Daily. 60 g 0   • meloxicam (MOBIC) 15 MG tablet Take 1 tablet by mouth Daily. 30 tablet 0     No current facility-administered medications for this visit.        Review of Systems  "  Constitutional: Negative.    HENT: Negative.    Eyes: Negative.    Respiratory: Negative.    Cardiovascular: Positive for leg swelling.   Gastrointestinal: Negative.    Endocrine: Negative.    Genitourinary: Negative.    Musculoskeletal: Positive for back pain.        Foot pain   Skin: Negative.    Allergic/Immunologic: Negative.    Neurological: Negative.    Hematological: Negative.    Psychiatric/Behavioral: Negative.          OBJECTIVE    Pulse 80   Ht 165.1 cm (65\")   Wt 112 kg (246 lb)   SpO2 99%   BMI 40.94 kg/m²       Physical Exam  Vitals signs reviewed.   Constitutional:       General: She is not in acute distress.     Appearance: Normal appearance. She is well-developed. She is obese.   HENT:      Head: Normocephalic and atraumatic.      Nose: Nose normal. No congestion.   Eyes:      Extraocular Movements: Extraocular movements intact.      Conjunctiva/sclera: Conjunctivae normal.      Pupils: Pupils are equal, round, and reactive to light.   Cardiovascular:      Rate and Rhythm: Normal rate.      Pulses: Normal pulses.           Dorsalis pedis pulses are 2+ on the right side and 2+ on the left side.        Posterior tibial pulses are 2+ on the right side and 2+ on the left side.   Pulmonary:      Effort: Pulmonary effort is normal. No respiratory distress.      Breath sounds: No wheezing.   Musculoskeletal: Normal range of motion.         General: Tenderness present.      Right foot: Normal range of motion. Deformity and bunion present. No Charcot foot, foot drop or prominent metatarsal heads.      Left foot: Normal range of motion. Deformity, bunion and prominent metatarsal heads present. No Charcot foot or foot drop.        Feet:    Feet:      Right foot:      Protective Sensation: 5 sites tested. 5 sites sensed.      Skin integrity: Skin integrity normal.      Toenail Condition: Right toenails are abnormally thick. Fungal disease present.     Left foot:      Protective Sensation: 5 sites tested. " 5 sites sensed.      Skin integrity: Skin integrity normal.      Toenail Condition: Left toenails are abnormally thick. Fungal disease present.  Skin:     General: Skin is warm and dry.      Capillary Refill: Capillary refill takes less than 2 seconds.   Neurological:      General: No focal deficit present.      Mental Status: She is alert and oriented to person, place, and time.   Psychiatric:         Mood and Affect: Mood normal.         Behavior: Behavior normal.         Thought Content: Thought content normal.         Gait: normal     Assistive Device: none     Small Joint Arthrocentesis: L fourth MTP  Consent given by: patient  Site marked: site marked  Timeout: Immediately prior to procedure a time out was called to verify the correct patient, procedure, equipment, support staff and site/side marked as required   Supporting Documentation  Indications: pain   Procedure Details  Location: fourth toe - L fourth MTP  Needle size: 27 G  Approach: dorsal  Patient tolerance: patient tolerated the procedure well with no immediate complications          Left hallux and second digit nail biopsy  12/02/20  10:23 CST  Risks and benefits discussed  Part of nail plates removed with nail nippers  Nail bed scrapings taken with dermal curette  Specimen will be sent to [facility]  Patient tolerated the procedure well with no complications      ASSESSMENT AND PLAN    Diagnoses and all orders for this visit:    1. Right foot pain (Primary)  -     XR Foot 3+ View Right  -     bupivacaine (MARCAINE) 0.5 % injection 1 mL  -     dexamethasone (DECADRON) injection 2 mg    2. Hallux valgus of right foot    3. Left foot pain    4. Hallux valgus of left foot    5. Metatarsalgia, left foot    6. Capsulitis of foot, left    7. Onychomycosis    Other orders  -     meloxicam (MOBIC) 15 MG tablet; Take 1 tablet by mouth Daily.  Dispense: 30 tablet; Refill: 0  -     Small Joint Arthrocentesis        - Comprehensive foot and ankle exam performed.  -  Diagnosis, prevention and treatment of fungal toenails was discussed with the patient in detail.  Nail biopsy and treatment with oral fungal versus nail avulsions both temporary permanent versus regular debridements were discussed.  - Patient elected for nail biopsy at this time. See procedure note  -Radiographs taken and reviewed of right foot.  No acute osseous or articular abnormalities.  Mild HAV deformity with met adductus.  Reviewed left foot radiographs with no acute osseous or articular abnormalities.  -Diagnosis, etiology and treatment of hallux valgus discussed in detail.  Conservative and surgical treatment options were discussed.  Patient wishes to think about her options and return at a later date with decision.  She will continue to use bunion sleeves and wide toe box shoes.  -Diagnosis, etiology and treatment of metatarsalgia/capsulitis discussed in detail with the patient.  Recommended OTC arch supports with metatarsal pads.  Steroid injection into fourth MTP.  See procedure note above.  -Rx Mobic  - All questions were answered and the patient is in agreement with the current treatment plan.  - RTC in 3 weeks for toenail biopsy results.              This document has been electronically signed by Logan Price DPM on December 2, 2020 10:24 CST     12/2/2020  10:24 CST

## 2020-12-16 DIAGNOSIS — I10 ESSENTIAL HYPERTENSION: Chronic | ICD-10-CM

## 2020-12-16 DIAGNOSIS — R60.9 PERIPHERAL EDEMA: ICD-10-CM

## 2020-12-17 DIAGNOSIS — E78.00 HYPERCHOLESTEREMIA: Primary | ICD-10-CM

## 2020-12-17 RX ORDER — EZETIMIBE 10 MG/1
10 TABLET ORAL DAILY
Qty: 90 TABLET | Refills: 1 | Status: SHIPPED | OUTPATIENT
Start: 2020-12-17 | End: 2022-02-11

## 2020-12-22 RX ORDER — LISINOPRIL 20 MG/1
TABLET ORAL
Qty: 30 TABLET | Refills: 5 | Status: SHIPPED | OUTPATIENT
Start: 2020-12-22 | End: 2021-07-09

## 2020-12-22 RX ORDER — HYDROCHLOROTHIAZIDE 25 MG/1
TABLET ORAL
Qty: 90 TABLET | Refills: 3 | Status: SHIPPED | OUTPATIENT
Start: 2020-12-22 | End: 2021-12-27

## 2020-12-23 ENCOUNTER — PRIOR AUTHORIZATION (OUTPATIENT)
Dept: FAMILY MEDICINE CLINIC | Facility: CLINIC | Age: 50
End: 2020-12-23

## 2020-12-23 NOTE — TELEPHONE ENCOUNTER
ARCHIE PIPER Key: [id number] - PA Case ID: [id number] - Rx #: [id number] help? Call us at [phone]  Outcome  Approvedtoday  PA Case: [id number], Status: Approved, Coverage Starts on: 12/23/2020 12:00:00 AM, Coverage Ends on: 12/23/2021 12:00:00 AM.  Drug  Ezetimibe 10MG tablets

## 2021-01-04 ENCOUNTER — OFFICE VISIT (OUTPATIENT)
Dept: PODIATRY | Facility: CLINIC | Age: 51
End: 2021-01-04

## 2021-01-04 ENCOUNTER — LAB (OUTPATIENT)
Dept: LAB | Facility: HOSPITAL | Age: 51
End: 2021-01-04

## 2021-01-04 VITALS — WEIGHT: 246 LBS | BODY MASS INDEX: 40.98 KG/M2 | HEART RATE: 67 BPM | OXYGEN SATURATION: 98 % | HEIGHT: 65 IN

## 2021-01-04 DIAGNOSIS — B35.1 ONYCHOMYCOSIS: ICD-10-CM

## 2021-01-04 DIAGNOSIS — B35.1 ONYCHOMYCOSIS: Primary | ICD-10-CM

## 2021-01-04 LAB
ALBUMIN SERPL-MCNC: 4 G/DL (ref 3.5–5.2)
ALP SERPL-CCNC: 63 U/L (ref 39–117)
ALT SERPL W P-5'-P-CCNC: 27 U/L (ref 1–33)
AST SERPL-CCNC: 23 U/L (ref 1–32)
BILIRUB CONJ SERPL-MCNC: <0.2 MG/DL (ref 0–0.3)
BILIRUB INDIRECT SERPL-MCNC: NORMAL MG/DL
BILIRUB SERPL-MCNC: 0.2 MG/DL (ref 0–1.2)
PROT SERPL-MCNC: 7.1 G/DL (ref 6–8.5)

## 2021-01-04 PROCEDURE — 80076 HEPATIC FUNCTION PANEL: CPT

## 2021-01-04 PROCEDURE — 36415 COLL VENOUS BLD VENIPUNCTURE: CPT

## 2021-01-04 PROCEDURE — 99213 OFFICE O/P EST LOW 20 MIN: CPT | Performed by: PODIATRIST

## 2021-01-04 NOTE — PROGRESS NOTES
Fidelina Dailey  1970  50 y.o. female     Patient returns to clinic today for a follow up appointment for [place] results    01/04/2021     Chief Complaint   Patient presents with   • Left Foot - Nail Problem       History of Present Illness    Fidelina Dailey is a 50 y.o.female who presents to clinic today for follow-up of her toenail biopsy.  Foot pain has improved.    Past Medical History:   Diagnosis Date   • Anxiety    • Bunion    • Hyperlipidemia    • Hypertension    • Verruca    • Yeast infection          Past Surgical History:   Procedure Laterality Date   • BILATERAL BREAST REDUCTION  2018   • EXPLORATORY LAPAROTOMY     • GALLBLADDER SURGERY     • GASTRIC SLEEVE LAPAROSCOPIC     • KIDNEY STONE SURGERY  2017   • SKIN SURGERY     • TUBAL ABDOMINAL LIGATION           Family History   Problem Relation Age of Onset   • Hypertension Mother    • Cancer Mother    • Rashes / Skin problems Mother    • No Known Problems Father    • Down syndrome Son    • Breast cancer Maternal Grandmother        No Known Allergies    Social History     Socioeconomic History   • Marital status:      Spouse name: Not on file   • Number of children: Not on file   • Years of education: Not on file   • Highest education level: Not on file   Tobacco Use   • Smoking status: Never Smoker   • Smokeless tobacco: Never Used   Substance and Sexual Activity   • Alcohol use: No   • Drug use: No   • Sexual activity: Yes     Partners: Male     Birth control/protection: Tubal ligation         Current Outpatient Medications   Medication Sig Dispense Refill   • calcium carbonate-vitamin d 600-400 MG-UNIT per tablet Take 1 tablet by mouth.     • Cholecalciferol 4000 units capsule Take 400 Units by mouth.     • cyanocobalamin ([employer] VITAMIN B-12) 1000 MCG tablet Take 1 tablet by mouth.     • ezetimibe (Zetia) 10 MG tablet Take 1 tablet by mouth Daily. For cholesterol in addition to pravastatin 90 tablet 1   • hydroCHLOROthiazide  "(HYDRODIURIL) 25 MG tablet TAKE ONE TABLET BY MOUTH DAILY 90 tablet 3   • ibuprofen (ADVIL,MOTRIN) 800 MG tablet Take 800 mg by mouth Every 6 (Six) Hours As Needed. for pain  0   • lisinopril (PRINIVIL,ZESTRIL) 20 MG tablet TAKE 1 TABLET BY MOUTH DAILY 30 tablet 5   • meloxicam (MOBIC) 15 MG tablet Take 1 tablet by mouth Daily. 30 tablet 0   • Multiple Vitamins-Minerals (ONE-A-DAY WOMENS VITACRAVES PO) Take  by mouth.     • omeprazole (priLOSEC) 40 MG capsule Take 1 capsule by mouth Daily. 30 capsule 11   • polyethylene glycol (GoLYTELY) 236 g solution Starting at noon on day prior to procedure, drink 8 ounces every 30 minutes until all gone or stools are clear. May add flavor packet. 4000 mL 0   • pravastatin (PRAVACHOL) 40 MG tablet Take 1.5 tablets by mouth Daily. 135 tablet 1   • sucralfate (CARAFATE) 1 g tablet Take 1 tablet by mouth 4 (Four) Times a Day. Dissolve in an ounce of water to take dose. 120 tablet 5   • triamcinolone (KENALOG) 0.1 % ointment Apply  topically to the appropriate area as directed Daily. 60 g 0     No current facility-administered medications for this visit.        Review of Systems   Constitutional: Negative.    HENT: Negative.    Eyes: Negative.    Respiratory: Negative.    Cardiovascular: Positive for leg swelling.   Gastrointestinal: Negative.    Musculoskeletal: Positive for back pain.   Psychiatric/Behavioral: Negative.          OBJECTIVE    Pulse 67   Ht 165.1 cm (65\")   Wt 112 kg (246 lb)   SpO2 98%   BMI 40.94 kg/m²       Physical Exam  Vitals signs reviewed.   Constitutional:       General: She is not in acute distress.     Appearance: Normal appearance. She is well-developed. She is obese.   HENT:      Head: Normocephalic and atraumatic.      Nose: Nose normal. No congestion.   Cardiovascular:      Rate and Rhythm: Normal rate.      Pulses: Normal pulses.           Dorsalis pedis pulses are 2+ on the right side and 2+ on the left side.        Posterior tibial pulses are " 2+ on the right side and 2+ on the left side.   Pulmonary:      Effort: Pulmonary effort is normal. No respiratory distress.      Breath sounds: No wheezing.   Musculoskeletal: Normal range of motion.      Right foot: Normal range of motion. Deformity and bunion present. No Charcot foot, foot drop or prominent metatarsal heads.      Left foot: Normal range of motion. Deformity, bunion and prominent metatarsal heads present. No Charcot foot or foot drop.        Feet:    Feet:      Right foot:      Protective Sensation: 5 sites tested. 5 sites sensed.      Skin integrity: Skin integrity normal.      Toenail Condition: Right toenails are abnormally thick. Fungal disease present.     Left foot:      Protective Sensation: 5 sites tested. 5 sites sensed.      Skin integrity: Skin integrity normal.      Toenail Condition: Left toenails are abnormally thick. Fungal disease present.  Skin:     General: Skin is warm and dry.      Capillary Refill: Capillary refill takes less than 2 seconds.   Neurological:      General: No focal deficit present.      Mental Status: She is alert and oriented to person, place, and time.   Psychiatric:         Mood and Affect: Mood normal.         Behavior: Behavior normal.         Thought Content: Thought content normal.         Gait: normal     Assistive Device: none     Procedures        ASSESSMENT AND PLAN    Diagnoses and all orders for this visit:    1. Onychomycosis (Primary)  -     Hepatic Function Panel; Future        -Toenail biopsy is positive for fungus.  Discussed treatment options.  Patient elected for treatment with oral antifungal medication.  Hepatic function panel ordered.  If within normal limits will prescribe oral Lamisil and recheck LFTs in 4 weeks.  All her questions were answered.  Recheck as needed.              This document has been electronically signed by Logan Price DPM on January 4, 2021 13:49 CST     1/4/2021  13:49 CST

## 2021-01-06 ENCOUNTER — TELEPHONE (OUTPATIENT)
Dept: PODIATRY | Facility: CLINIC | Age: 51
End: 2021-01-06

## 2021-01-06 DIAGNOSIS — B35.1 ONYCHOMYCOSIS: Primary | ICD-10-CM

## 2021-01-06 RX ORDER — TERBINAFINE HYDROCHLORIDE 250 MG/1
250 TABLET ORAL DAILY
Qty: 30 TABLET | Refills: 2 | Status: SHIPPED | OUTPATIENT
Start: 2021-01-06 | End: 2022-02-11

## 2021-01-06 NOTE — TELEPHONE ENCOUNTER
----- Message from Logan Price DPM sent at 1/6/2021  9:08 AM CST -----  LFTs normal. Rx for Lamisil called in. May LFTs in 4 weeks. Order placed.

## 2021-07-09 DIAGNOSIS — I10 ESSENTIAL HYPERTENSION: Chronic | ICD-10-CM

## 2021-07-09 RX ORDER — LISINOPRIL 20 MG/1
TABLET ORAL
Qty: 30 TABLET | Refills: 5 | Status: SHIPPED | OUTPATIENT
Start: 2021-07-09 | End: 2021-12-27

## 2021-07-21 ENCOUNTER — PRIOR AUTHORIZATION (OUTPATIENT)
Dept: FAMILY MEDICINE CLINIC | Facility: CLINIC | Age: 51
End: 2021-07-21

## 2021-07-21 NOTE — TELEPHONE ENCOUNTER
ARCHIE RILEY Key: [id number] - PA Case ID: [id number] - Rx #: [id number] help? Call us at [phone]  PA Case: [id number], Status: Approved, Coverage Starts on: 7/21/2021 12:00:00 AM, Coverage Ends on: 7/21/2022 12:00:00 AM.  Drug  Pravastatin Sodium 40MG tablets

## 2021-07-30 ENCOUNTER — LAB (OUTPATIENT)
Dept: LAB | Facility: OTHER | Age: 51
End: 2021-07-30

## 2021-07-30 DIAGNOSIS — E55.9 VITAMIN D DEFICIENCY: ICD-10-CM

## 2021-07-30 DIAGNOSIS — E53.8 B12 DEFICIENCY: ICD-10-CM

## 2021-07-30 DIAGNOSIS — I10 ESSENTIAL HYPERTENSION: ICD-10-CM

## 2021-07-30 DIAGNOSIS — E78.00 HYPERCHOLESTEREMIA: ICD-10-CM

## 2021-07-30 LAB
25(OH)D3 SERPL-MCNC: 71.1 NG/ML
ALBUMIN SERPL-MCNC: 4.1 G/DL (ref 3.5–5)
ALBUMIN/GLOB SERPL: 1.4 G/DL (ref 1.1–1.8)
ALP SERPL-CCNC: 62 U/L (ref 38–126)
ALT SERPL W P-5'-P-CCNC: 18 U/L
ANION GAP SERPL CALCULATED.3IONS-SCNC: 4 MMOL/L (ref 5–15)
AST SERPL-CCNC: 21 U/L (ref 14–36)
BASOPHILS # BLD AUTO: 0.04 10*3/MM3 (ref 0–0.2)
BASOPHILS NFR BLD AUTO: 0.6 % (ref 0–1.5)
BILIRUB SERPL-MCNC: 0.5 MG/DL (ref 0.2–1.3)
BUN SERPL-MCNC: 21 MG/DL (ref 7–23)
BUN/CREAT SERPL: 26.9 (ref 7–25)
CALCIUM SPEC-SCNC: 9.8 MG/DL (ref 8.4–10.2)
CHLORIDE SERPL-SCNC: 106 MMOL/L (ref 101–112)
CHOLEST SERPL-MCNC: 161 MG/DL (ref 150–200)
CO2 SERPL-SCNC: 31 MMOL/L (ref 22–30)
CREAT SERPL-MCNC: 0.78 MG/DL (ref 0.52–1.04)
DEPRECATED RDW RBC AUTO: 46.8 FL (ref 37–54)
EOSINOPHIL # BLD AUTO: 0.52 10*3/MM3 (ref 0–0.4)
EOSINOPHIL NFR BLD AUTO: 7.2 % (ref 0.3–6.2)
ERYTHROCYTE [DISTWIDTH] IN BLOOD BY AUTOMATED COUNT: 14 % (ref 12.3–15.4)
FOLATE SERPL-MCNC: >20 NG/ML (ref 4.78–24.2)
GFR SERPL CREATININE-BSD FRML MDRD: 78 ML/MIN/1.73 (ref 51–120)
GLOBULIN UR ELPH-MCNC: 3 GM/DL (ref 2.3–3.5)
GLUCOSE SERPL-MCNC: 88 MG/DL (ref 70–99)
HCT VFR BLD AUTO: 37.3 % (ref 34–46.6)
HDLC SERPL-MCNC: 53 MG/DL (ref 40–59)
HGB BLD-MCNC: 12.3 G/DL (ref 12–15.9)
LDLC SERPL CALC-MCNC: 92 MG/DL
LDLC/HDLC SERPL: 1.71 {RATIO} (ref 0–3.22)
LYMPHOCYTES # BLD AUTO: 1.91 10*3/MM3 (ref 0.7–3.1)
LYMPHOCYTES NFR BLD AUTO: 26.5 % (ref 19.6–45.3)
MCH RBC QN AUTO: 31.6 PG (ref 26.6–33)
MCHC RBC AUTO-ENTMCNC: 33 G/DL (ref 31.5–35.7)
MCV RBC AUTO: 95.9 FL (ref 79–97)
MONOCYTES # BLD AUTO: 0.62 10*3/MM3 (ref 0.1–0.9)
MONOCYTES NFR BLD AUTO: 8.6 % (ref 5–12)
NEUTROPHILS NFR BLD AUTO: 4.13 10*3/MM3 (ref 1.7–7)
NEUTROPHILS NFR BLD AUTO: 57.1 % (ref 42.7–76)
PLATELET # BLD AUTO: 221 10*3/MM3 (ref 140–450)
PMV BLD AUTO: 11.8 FL (ref 6–12)
POTASSIUM SERPL-SCNC: 3.9 MMOL/L (ref 3.4–5)
PROT SERPL-MCNC: 7.1 G/DL (ref 6.3–8.6)
RBC # BLD AUTO: 3.89 10*6/MM3 (ref 3.77–5.28)
SODIUM SERPL-SCNC: 141 MMOL/L (ref 137–145)
TRIGL SERPL-MCNC: 88 MG/DL
VIT B12 BLD-MCNC: 1092 PG/ML (ref 211–946)
VLDLC SERPL-MCNC: 16 MG/DL (ref 5–40)
WBC # BLD AUTO: 7.22 10*3/MM3 (ref 3.4–10.8)

## 2021-07-30 PROCEDURE — 82746 ASSAY OF FOLIC ACID SERUM: CPT | Performed by: NURSE PRACTITIONER

## 2021-07-30 PROCEDURE — 36415 COLL VENOUS BLD VENIPUNCTURE: CPT | Performed by: NURSE PRACTITIONER

## 2021-07-30 PROCEDURE — 80061 LIPID PANEL: CPT | Performed by: NURSE PRACTITIONER

## 2021-07-30 PROCEDURE — 80053 COMPREHEN METABOLIC PANEL: CPT | Performed by: NURSE PRACTITIONER

## 2021-07-30 PROCEDURE — 85025 COMPLETE CBC W/AUTO DIFF WBC: CPT | Performed by: NURSE PRACTITIONER

## 2021-07-30 PROCEDURE — 82306 VITAMIN D 25 HYDROXY: CPT | Performed by: NURSE PRACTITIONER

## 2021-07-30 PROCEDURE — 82607 VITAMIN B-12: CPT | Performed by: NURSE PRACTITIONER

## 2021-08-06 DIAGNOSIS — L29.2 VULVAR ITCHING: ICD-10-CM

## 2021-09-14 DIAGNOSIS — E78.00 HYPERCHOLESTEREMIA: ICD-10-CM

## 2021-09-14 RX ORDER — PRAVASTATIN SODIUM 40 MG
60 TABLET ORAL DAILY
Qty: 135 TABLET | Refills: 0 | Status: SHIPPED | OUTPATIENT
Start: 2021-09-14 | End: 2022-02-11 | Stop reason: SDUPTHER

## 2021-10-19 RX ORDER — OMEPRAZOLE 40 MG/1
40 CAPSULE, DELAYED RELEASE ORAL DAILY
Qty: 30 CAPSULE | Refills: 11 | OUTPATIENT
Start: 2021-10-19

## 2021-11-18 RX ORDER — OMEPRAZOLE 40 MG/1
40 CAPSULE, DELAYED RELEASE ORAL DAILY
Qty: 30 CAPSULE | Refills: 11 | OUTPATIENT
Start: 2021-11-18

## 2021-12-27 DIAGNOSIS — R60.9 PERIPHERAL EDEMA: ICD-10-CM

## 2021-12-27 DIAGNOSIS — I10 ESSENTIAL HYPERTENSION: Chronic | ICD-10-CM

## 2021-12-27 RX ORDER — HYDROCHLOROTHIAZIDE 25 MG/1
TABLET ORAL
Qty: 90 TABLET | Refills: 3 | Status: SHIPPED | OUTPATIENT
Start: 2021-12-27 | End: 2022-10-04 | Stop reason: SDUPTHER

## 2021-12-27 RX ORDER — LISINOPRIL 20 MG/1
TABLET ORAL
Qty: 30 TABLET | Refills: 5 | Status: SHIPPED | OUTPATIENT
Start: 2021-12-27 | End: 2022-07-29

## 2022-02-02 RX ORDER — OMEPRAZOLE 40 MG/1
40 CAPSULE, DELAYED RELEASE ORAL DAILY
Qty: 87 CAPSULE | Refills: 11 | OUTPATIENT
Start: 2022-02-02

## 2022-02-07 DIAGNOSIS — E78.00 HYPERCHOLESTEREMIA: ICD-10-CM

## 2022-02-07 RX ORDER — PRAVASTATIN SODIUM 40 MG
TABLET ORAL
Qty: 135 TABLET | Refills: 0 | OUTPATIENT
Start: 2022-02-07

## 2022-02-11 ENCOUNTER — OFFICE VISIT (OUTPATIENT)
Dept: FAMILY MEDICINE CLINIC | Facility: CLINIC | Age: 52
End: 2022-02-11

## 2022-02-11 ENCOUNTER — LAB (OUTPATIENT)
Dept: LAB | Facility: OTHER | Age: 52
End: 2022-02-11

## 2022-02-11 VITALS
DIASTOLIC BLOOD PRESSURE: 84 MMHG | HEIGHT: 65 IN | TEMPERATURE: 97.4 F | BODY MASS INDEX: 43.32 KG/M2 | SYSTOLIC BLOOD PRESSURE: 138 MMHG | OXYGEN SATURATION: 99 % | RESPIRATION RATE: 16 BRPM | HEART RATE: 87 BPM | WEIGHT: 260 LBS

## 2022-02-11 DIAGNOSIS — E55.9 VITAMIN D DEFICIENCY: ICD-10-CM

## 2022-02-11 DIAGNOSIS — R10.9 LEFT FLANK PAIN: ICD-10-CM

## 2022-02-11 DIAGNOSIS — K21.9 GASTROESOPHAGEAL REFLUX DISEASE WITHOUT ESOPHAGITIS: ICD-10-CM

## 2022-02-11 DIAGNOSIS — E53.8 B12 DEFICIENCY: ICD-10-CM

## 2022-02-11 DIAGNOSIS — G89.29 CHRONIC LEFT FLANK PAIN: ICD-10-CM

## 2022-02-11 DIAGNOSIS — I10 ESSENTIAL HYPERTENSION: ICD-10-CM

## 2022-02-11 DIAGNOSIS — N28.89 LEFT KIDNEY MASS: ICD-10-CM

## 2022-02-11 DIAGNOSIS — Z51.81 ENCOUNTER FOR THERAPEUTIC DRUG LEVEL MONITORING: Primary | ICD-10-CM

## 2022-02-11 DIAGNOSIS — E78.00 HYPERCHOLESTEREMIA: ICD-10-CM

## 2022-02-11 DIAGNOSIS — E78.2 MIXED HYPERLIPIDEMIA: ICD-10-CM

## 2022-02-11 DIAGNOSIS — R93.5 ABNORMAL CT OF THE ABDOMEN: ICD-10-CM

## 2022-02-11 DIAGNOSIS — R10.9 CHRONIC LEFT FLANK PAIN: ICD-10-CM

## 2022-02-11 LAB
25(OH)D3 SERPL-MCNC: 51.3 NG/ML
CHOLEST SERPL-MCNC: 196 MG/DL (ref 150–200)
FOLATE SERPL-MCNC: 15.9 NG/ML (ref 4.78–24.2)
HDLC SERPL-MCNC: 54 MG/DL (ref 40–59)
LDLC SERPL CALC-MCNC: 109 MG/DL
LDLC/HDLC SERPL: 1.92 {RATIO} (ref 0–3.22)
T4 SERPL-MCNC: 6.95 MCG/DL (ref 4.5–11.7)
TRIGL SERPL-MCNC: 191 MG/DL
TSH SERPL DL<=0.05 MIU/L-ACNC: 1.51 UIU/ML (ref 0.27–4.2)
VIT B12 BLD-MCNC: 776 PG/ML (ref 211–946)
VLDLC SERPL-MCNC: 33 MG/DL (ref 5–40)

## 2022-02-11 PROCEDURE — 82607 VITAMIN B-12: CPT | Performed by: NURSE PRACTITIONER

## 2022-02-11 PROCEDURE — 84436 ASSAY OF TOTAL THYROXINE: CPT | Performed by: NURSE PRACTITIONER

## 2022-02-11 PROCEDURE — 82746 ASSAY OF FOLIC ACID SERUM: CPT | Performed by: NURSE PRACTITIONER

## 2022-02-11 PROCEDURE — 80307 DRUG TEST PRSMV CHEM ANLYZR: CPT | Performed by: NURSE PRACTITIONER

## 2022-02-11 PROCEDURE — 80061 LIPID PANEL: CPT | Performed by: NURSE PRACTITIONER

## 2022-02-11 PROCEDURE — 99214 OFFICE O/P EST MOD 30 MIN: CPT | Performed by: NURSE PRACTITIONER

## 2022-02-11 PROCEDURE — 84443 ASSAY THYROID STIM HORMONE: CPT | Performed by: NURSE PRACTITIONER

## 2022-02-11 PROCEDURE — 36415 COLL VENOUS BLD VENIPUNCTURE: CPT | Performed by: NURSE PRACTITIONER

## 2022-02-11 PROCEDURE — 82306 VITAMIN D 25 HYDROXY: CPT | Performed by: NURSE PRACTITIONER

## 2022-02-11 RX ORDER — PRAVASTATIN SODIUM 40 MG
60 TABLET ORAL DAILY
Qty: 135 TABLET | Refills: 0 | Status: SHIPPED | OUTPATIENT
Start: 2022-02-11 | End: 2022-04-29

## 2022-02-11 RX ORDER — HYDROCODONE BITARTRATE AND ACETAMINOPHEN 5; 325 MG/1; MG/1
1 TABLET ORAL EVERY 8 HOURS PRN
Qty: 60 TABLET | Refills: 0 | Status: SHIPPED | OUTPATIENT
Start: 2022-02-11 | End: 2022-06-27

## 2022-02-11 RX ORDER — OMEPRAZOLE 40 MG/1
40 CAPSULE, DELAYED RELEASE ORAL DAILY
Qty: 30 CAPSULE | Refills: 11 | Status: SHIPPED | OUTPATIENT
Start: 2022-02-11 | End: 2022-10-04 | Stop reason: SDUPTHER

## 2022-02-11 RX ORDER — HYDROCODONE BITARTRATE AND ACETAMINOPHEN 5; 325 MG/1; MG/1
1 TABLET ORAL EVERY 8 HOURS PRN
Qty: 60 TABLET | Refills: 0 | Status: SHIPPED | OUTPATIENT
Start: 2022-02-11 | End: 2022-02-11 | Stop reason: SDUPTHER

## 2022-02-11 NOTE — PROGRESS NOTES
Subjective   Fidelina Dailey is a 51 y.o. female.       Chief Complaint   Patient presents with   • Hospital Follow Up Visit     kidney mass        History of Present Illness   Patient seen in ED of [facility] for left flank pain on 2/9/22. Hospital records are reviewed at this time. She is s/p gastric bypass, tubal ligation. No renal or ureteral calculi were found on CT abd without contrast. In workup for kidney stones was noted to have 2cm left renal mass. This mass was evidently previously noted to be present on CT abd dated 10/18/2017, and has grown. Due to density of the lesion, the radiologist favors the mass for renal cell carcinoma. Radiologist recommends further characterization with MRI abdomen with and without contrast. Her pain in left flank is chronic, constant, and severe, rated at 8/10 during exam. OTC pain relievers are not helping. Low dose hydrocodone prescription discussed, will be prescribed.    No nausea, vomiting, fevers or chills. No recent unexplained weight loss. No other complaints today.   Past Surgical History:   Procedure Laterality Date   • BILATERAL BREAST REDUCTION  2018   • EXPLORATORY LAPAROTOMY     • GALLBLADDER SURGERY     • GASTRIC SLEEVE LAPAROSCOPIC     • KIDNEY STONE SURGERY  2017   • SKIN SURGERY     • TUBAL ABDOMINAL LIGATION        Social History     Socioeconomic History   • Marital status:    Tobacco Use   • Smoking status: Never Smoker   • Smokeless tobacco: Never Used   Substance and Sexual Activity   • Alcohol use: No   • Drug use: No   • Sexual activity: Yes     Partners: Male     Birth control/protection: Tubal ligation      The following portions of the patient's history were reviewed and updated as appropriate: allergies, current medications, past family history, past medical history, past social history, past surgical history and problem list.    Review of Systems   Constitutional: Negative.    HENT: Negative.  Negative for congestion.    Eyes: Negative.   Negative for blurred vision, double vision, photophobia and visual disturbance.   Respiratory: Negative.  Negative for cough, shortness of breath, wheezing and stridor.    Cardiovascular: Negative.  Negative for chest pain, palpitations and leg swelling.   Gastrointestinal: Negative.  Negative for abdominal distention, abdominal pain, blood in stool, constipation, diarrhea, nausea, vomiting, GERD and indigestion.   Endocrine: Negative.  Negative for cold intolerance and heat intolerance.   Genitourinary: Positive for flank pain. Negative for dysuria, frequency and urinary incontinence.   Musculoskeletal: Negative for arthralgias and back pain.   Skin: Negative.    Allergic/Immunologic: Negative.  Negative for immunocompromised state.   Neurological: Negative.    Hematological: Negative.    Psychiatric/Behavioral: Negative.  Negative for agitation, behavioral problems, decreased concentration, dysphoric mood, hallucinations, self-injury, sleep disturbance, suicidal ideas, negative for hyperactivity, depressed mood and stress. The patient is not nervous/anxious.    All other systems reviewed and are negative.    PHQ-9 Depression Screening  Little interest or pleasure in doing things? 0   Feeling down, depressed, or hopeless? 0   Trouble falling or staying asleep, or sleeping too much?     Feeling tired or having little energy?     Poor appetite or overeating?     Feeling bad about yourself - or that you are a failure or have let yourself or your family down?     Trouble concentrating on things, such as reading the newspaper or watching television?     Moving or speaking so slowly that other people could have noticed? Or the opposite - being so fidgety or restless that you have been moving around a lot more than usual?     Thoughts that you would be better off dead, or of hurting yourself in some way?     PHQ-9 Total Score 0   If you checked off any problems, how difficult have these problems made it for you to do your  "work, take care of things at home, or get along with other people?      Patient understands the risks associated with this controlled medication, including tolerance and addiction.  Patient also agrees to only obtain this medication from me, and not from a another provider, unless that provider is covering for me in my absence.  Patient also agrees to be compliant in dosing, and not self adjust the dose of medication.  A signed controlled substance agreement is on file, and the patient has received a controlled substance education sheet at this a previous visit.  The patient has also signed a consent for treatment with a controlled substance as per [facility] policy. MIL was obtained.    Objective    Vitals:    02/11/22 0840   BP: 138/84   BP Location: Left arm   Patient Position: Sitting   Cuff Size: Adult   Pulse: 87   Resp: 16   Temp: 97.4 °F (36.3 °C)   SpO2: 99%   Weight: 118 kg (260 lb)   Height: 165.1 cm (65\")   PainSc:   8   PainLoc: Back     Body mass index is 43.27 kg/m².    Physical Exam  Vitals and nursing note reviewed.   Constitutional:       General: She is not in acute distress.     Appearance: Normal appearance. She is well-developed. She is obese. She is not ill-appearing or diaphoretic.   HENT:      Head: Normocephalic and atraumatic.   Eyes:      General: No scleral icterus.        Right eye: No discharge.         Left eye: No discharge.      Conjunctiva/sclera: Conjunctivae normal.      Pupils: Pupils are equal, round, and reactive to light.   Neck:      Thyroid: No thyromegaly.      Vascular: No carotid bruit or JVD.      Trachea: No tracheal deviation.   Cardiovascular:      Rate and Rhythm: Normal rate and regular rhythm.      Pulses: Normal pulses.      Heart sounds: Normal heart sounds. No murmur heard.  No friction rub. No gallop.    Pulmonary:      Effort: Pulmonary effort is normal. No respiratory distress.      Breath sounds: Normal breath sounds. No stridor. No wheezing, rhonchi " or rales.   Chest:      Chest wall: No tenderness.   Abdominal:      General: Bowel sounds are normal.      Palpations: Abdomen is soft.      Tenderness: There is left CVA tenderness.   Musculoskeletal:         General: No tenderness or deformity. Normal range of motion.      Cervical back: Normal range of motion and neck supple. No rigidity or tenderness.      Right lower leg: No edema.      Left lower leg: No edema.   Lymphadenopathy:      Cervical: No cervical adenopathy.   Skin:     General: Skin is warm and dry.      Capillary Refill: Capillary refill takes 2 to 3 seconds.      Coloration: Skin is not jaundiced or pale.      Findings: No bruising, erythema, lesion or rash.   Neurological:      General: No focal deficit present.      Mental Status: She is alert and oriented to person, place, and time. Mental status is at baseline.      Motor: No weakness.      Gait: Gait normal.   Psychiatric:         Mood and Affect: Mood normal.         Behavior: Behavior normal.         Thought Content: Thought content normal.         Judgment: Judgment normal.     enlarging left renal mass with left flank pain, CT abd favors the 2cm dense mass for RCC. MRI abd w/wo ordered as recommended by radiologist, schedule at [facility] for comparison with CT films, referral to urology, Dr Edilson Singer for evaluation. Contract, education for controlled substance prescribing reviewed, signed today, prescribed 5mg lortab/ 325 apap 1 po TID for severe left flank pain #60, call or come in for refill or adjusted dose. Labs due. CMP CBC reviewed from 2/9/22, others as below are needed. Refill pravastatin, omeprazole as needed.       Assessment/Plan   Diagnoses and all orders for this visit:    1. Encounter for therapeutic drug level monitoring (Primary)  -     Drug screen panel 1, serum    2. Hypercholesteremia  -     pravastatin (PRAVACHOL) 40 MG tablet; Take 1.5 tablets by mouth Daily.  Dispense: 135 tablet; Refill: 0    3. Gastroesophageal  reflux disease without esophagitis  -     omeprazole (priLOSEC) 40 MG capsule; Take 1 capsule by mouth Daily.  Dispense: 30 capsule; Refill: 11    4. B12 deficiency  -     Vitamin B12 & Folate    5. Mixed hyperlipidemia  -     Lipid Panel    6. Vitamin D deficiency  -     Vitamin D 25 Hydroxy    7. Essential hypertension  -     TSH  -     T4    8. Left flank pain  -     MRI abdomen w wo contrast; Future    9. Chronic left flank pain  -     Discontinue: HYDROcodone-acetaminophen (NORCO) 5-325 MG per tablet; Take 1 tablet by mouth Every 8 (Eight) Hours As Needed for Moderate Pain .  Dispense: 60 tablet; Refill: 0  -     HYDROcodone-acetaminophen (NORCO) 5-325 MG per tablet; Take 1 tablet by mouth Every 8 (Eight) Hours As Needed for Moderate Pain .  Dispense: 60 tablet; Refill: 0  -     Ambulatory Referral to Urology    10. Left kidney mass  -     Discontinue: HYDROcodone-acetaminophen (NORCO) 5-325 MG per tablet; Take 1 tablet by mouth Every 8 (Eight) Hours As Needed for Moderate Pain .  Dispense: 60 tablet; Refill: 0  -     HYDROcodone-acetaminophen (NORCO) 5-325 MG per tablet; Take 1 tablet by mouth Every 8 (Eight) Hours As Needed for Moderate Pain .  Dispense: 60 tablet; Refill: 0  -     MRI abdomen w wo contrast; Future  -     Ambulatory Referral to Urology    11. Abnormal CT of the abdomen  -     MRI abdomen w wo contrast; Future  -     Ambulatory Referral to Urology      Return in about 12 weeks (around 5/6/2022), or if symptoms worsen or fail to improve.               This document has been electronically signed by EDITH Graham on February 11, 2022 09:19 CST

## 2022-02-15 LAB
AMPHETAMINES SERPL QL SCN: NEGATIVE NG/ML
BARBITURATES SERPL QL SCN: NEGATIVE UG/ML
BENZODIAZ SERPL QL SCN: NEGATIVE NG/ML
CANNABINOIDS SERPL QL SCN: NEGATIVE NG/ML
COCAINE+BZE SERPL QL SCN: NEGATIVE NG/ML
METHADONE SERPL QL SCN: NEGATIVE NG/ML
OPIATES SERPL QL SCN: NEGATIVE NG/ML
OXYCODONE+OXYMORPHONE SERPLBLD QL SCN: NEGATIVE NG/ML
PCP SERPL QL SCN: NEGATIVE NG/ML
PROPOXYPH SERPL QL SCN: NEGATIVE NG/ML

## 2022-04-11 ENCOUNTER — TELEPHONE (OUTPATIENT)
Dept: FAMILY MEDICINE CLINIC | Facility: CLINIC | Age: 52
End: 2022-04-11

## 2022-04-29 DIAGNOSIS — E78.00 HYPERCHOLESTEREMIA: ICD-10-CM

## 2022-04-29 RX ORDER — PRAVASTATIN SODIUM 40 MG
TABLET ORAL
Qty: 135 TABLET | Refills: 0 | Status: SHIPPED | OUTPATIENT
Start: 2022-04-29 | End: 2022-06-02 | Stop reason: SDUPTHER

## 2022-06-02 DIAGNOSIS — E78.00 HYPERCHOLESTEREMIA: ICD-10-CM

## 2022-06-02 DIAGNOSIS — L29.2 VULVAR ITCHING: ICD-10-CM

## 2022-06-02 RX ORDER — PRAVASTATIN SODIUM 40 MG
60 TABLET ORAL DAILY
Qty: 135 TABLET | Refills: 0 | Status: SHIPPED | OUTPATIENT
Start: 2022-06-02 | End: 2022-10-04 | Stop reason: SDUPTHER

## 2022-06-27 ENCOUNTER — LAB (OUTPATIENT)
Dept: LAB | Facility: OTHER | Age: 52
End: 2022-06-27

## 2022-06-27 PROCEDURE — 87635 SARS-COV-2 COVID-19 AMP PRB: CPT | Performed by: PHYSICIAN ASSISTANT

## 2022-07-29 DIAGNOSIS — I10 ESSENTIAL HYPERTENSION: Chronic | ICD-10-CM

## 2022-07-29 RX ORDER — LISINOPRIL 20 MG/1
TABLET ORAL
Qty: 30 TABLET | Refills: 0 | Status: SHIPPED | OUTPATIENT
Start: 2022-07-29 | End: 2022-10-04 | Stop reason: SDUPTHER

## 2022-10-04 ENCOUNTER — OFFICE VISIT (OUTPATIENT)
Dept: FAMILY MEDICINE CLINIC | Facility: CLINIC | Age: 52
End: 2022-10-04

## 2022-10-04 VITALS
SYSTOLIC BLOOD PRESSURE: 132 MMHG | TEMPERATURE: 97 F | RESPIRATION RATE: 18 BRPM | DIASTOLIC BLOOD PRESSURE: 78 MMHG | BODY MASS INDEX: 39.15 KG/M2 | HEIGHT: 65 IN | WEIGHT: 235 LBS | HEART RATE: 73 BPM | OXYGEN SATURATION: 99 %

## 2022-10-04 DIAGNOSIS — E55.9 VITAMIN D DEFICIENCY: ICD-10-CM

## 2022-10-04 DIAGNOSIS — Z13.29 SCREENING FOR THYROID DISORDER: ICD-10-CM

## 2022-10-04 DIAGNOSIS — Z12.31 SCREENING MAMMOGRAM FOR BREAST CANCER: ICD-10-CM

## 2022-10-04 DIAGNOSIS — K21.9 GASTROESOPHAGEAL REFLUX DISEASE WITHOUT ESOPHAGITIS: ICD-10-CM

## 2022-10-04 DIAGNOSIS — E78.00 HYPERCHOLESTEREMIA: ICD-10-CM

## 2022-10-04 DIAGNOSIS — I10 ESSENTIAL HYPERTENSION: Chronic | ICD-10-CM

## 2022-10-04 DIAGNOSIS — R73.02 IGT (IMPAIRED GLUCOSE TOLERANCE): ICD-10-CM

## 2022-10-04 DIAGNOSIS — R60.9 PERIPHERAL EDEMA: ICD-10-CM

## 2022-10-04 DIAGNOSIS — L02.212 ABSCESS OF LOWER BACK: Primary | ICD-10-CM

## 2022-10-04 PROCEDURE — 99214 OFFICE O/P EST MOD 30 MIN: CPT | Performed by: PHYSICIAN ASSISTANT

## 2022-10-04 RX ORDER — OMEPRAZOLE 40 MG/1
40 CAPSULE, DELAYED RELEASE ORAL DAILY
Qty: 90 CAPSULE | Refills: 3 | Status: SHIPPED | OUTPATIENT
Start: 2022-10-04

## 2022-10-04 RX ORDER — HYDROCHLOROTHIAZIDE 25 MG/1
25 TABLET ORAL DAILY
Qty: 90 TABLET | Refills: 1 | Status: SHIPPED | OUTPATIENT
Start: 2022-10-04 | End: 2023-02-27 | Stop reason: SDUPTHER

## 2022-10-04 RX ORDER — PRAVASTATIN SODIUM 40 MG
60 TABLET ORAL DAILY
Qty: 135 TABLET | Refills: 1 | Status: SHIPPED | OUTPATIENT
Start: 2022-10-04 | End: 2023-03-16

## 2022-10-04 RX ORDER — SACCHAROMYCES BOULARDII 250 MG
250 CAPSULE ORAL 2 TIMES DAILY
Qty: 60 CAPSULE | Refills: 0 | Status: SHIPPED | OUTPATIENT
Start: 2022-10-04

## 2022-10-04 RX ORDER — CLINDAMYCIN HYDROCHLORIDE 300 MG/1
300 CAPSULE ORAL 4 TIMES DAILY
Qty: 40 CAPSULE | Refills: 0 | Status: SHIPPED | OUTPATIENT
Start: 2022-10-04

## 2022-10-04 RX ORDER — LISINOPRIL 20 MG/1
20 TABLET ORAL DAILY
Qty: 90 TABLET | Refills: 1 | Status: SHIPPED | OUTPATIENT
Start: 2022-10-04

## 2022-10-04 NOTE — PROGRESS NOTES
Subjective   Fidelina Dailey is a 51 y.o. female.     History of Present Illness     Pt presents today for a follow up on insect bite to left lower back x 10 days. She presented to [facility] on 9/[facility] for further evaluation and assessment and was prescribed keflex 500mg bid x 10 days. Today she reports gradual improvement in affected area. She does admit to scant serous drainage to affected area. She denies fever, chills, nausea, vomiting, dyspnea, chest pain. Does admit to good I&O. Discussed I&D with patient in office today, she is agreeable.     Essential htn - chronic, controlled with lisinopril 20mg daily and hctz 25mg.     GERD - chronic, controlled with prilosec 40mg daily.    Hld - managed with pravastatin 60mg daily. Implementing lifestyle changes of low fat diet.     The following portions of the patient's history were reviewed and updated as appropriate: allergies, current medications, past family history, past medical history, past social history, past surgical history and problem list.    Review of Systems   Constitutional: Negative.    HENT: Negative.  Negative for congestion.    Eyes: Negative.  Negative for blurred vision, double vision, photophobia and visual disturbance.   Respiratory: Negative.  Negative for cough, shortness of breath, wheezing and stridor.    Cardiovascular: Negative.  Negative for chest pain, palpitations and leg swelling.   Gastrointestinal: Negative.  Negative for abdominal distention, abdominal pain, blood in stool, constipation, diarrhea, nausea, vomiting, GERD and indigestion.   Endocrine: Negative.  Negative for cold intolerance and heat intolerance.   Genitourinary: Negative for dysuria, flank pain, frequency and urinary incontinence.   Musculoskeletal: Negative for arthralgias and back pain.   Skin: Positive for wound.        Abscess lower back   Allergic/Immunologic: Negative.  Negative for immunocompromised state.   Neurological: Negative.    Hematological: Negative.     Psychiatric/Behavioral: Negative.  Negative for agitation, behavioral problems, decreased concentration, dysphoric mood, hallucinations, self-injury, sleep disturbance, suicidal ideas, negative for hyperactivity, depressed mood and stress. The patient is not nervous/anxious.    All other systems reviewed and are negative.      Objective   Physical Exam  Vitals and nursing note reviewed.   Constitutional:       General: She is not in acute distress.     Appearance: Normal appearance. She is well-developed. She is obese. She is not ill-appearing or diaphoretic.   HENT:      Head: Normocephalic and atraumatic.      Right Ear: External ear normal.      Left Ear: External ear normal.   Eyes:      General: No scleral icterus.        Right eye: No discharge.         Left eye: No discharge.      Conjunctiva/sclera: Conjunctivae normal.      Pupils: Pupils are equal, round, and reactive to light.   Neck:      Thyroid: No thyromegaly.      Vascular: No carotid bruit or JVD.      Trachea: No tracheal deviation.   Cardiovascular:      Rate and Rhythm: Normal rate and regular rhythm.      Pulses: Normal pulses.      Heart sounds: Normal heart sounds. No murmur heard.    No friction rub. No gallop.   Pulmonary:      Effort: Pulmonary effort is normal. No respiratory distress.      Breath sounds: Normal breath sounds. No stridor. No wheezing, rhonchi or rales.   Chest:      Chest wall: No tenderness.   Abdominal:      General: Bowel sounds are normal. There is no distension.      Palpations: Abdomen is soft.      Tenderness: There is no abdominal tenderness. There is no guarding.   Musculoskeletal:         General: No tenderness or deformity. Normal range of motion.      Cervical back: Normal range of motion and neck supple. No rigidity or tenderness.      Right lower leg: No edema.      Left lower leg: No edema.   Lymphadenopathy:      Cervical: No cervical adenopathy.   Skin:     General: Skin is warm and dry.      Capillary  Refill: Capillary refill takes 2 to 3 seconds.      Findings: Abscess present.          Neurological:      General: No focal deficit present.      Mental Status: She is alert and oriented to person, place, and time. Mental status is at baseline.      Motor: No weakness.      Gait: Gait normal.   Psychiatric:         Mood and Affect: Mood normal.         Behavior: Behavior normal.         Thought Content: Thought content normal.         Judgment: Judgment normal.       Vitals:    10/04/22 1512   BP: 132/78   Pulse: 73   Resp: 18   Temp: 97 °F (36.1 °C)   SpO2: 99%        Assessment & Plan   Diagnoses and all orders for this visit:    1. Abscess of lower back (Primary)  -     clindamycin (CLEOCIN) 300 MG capsule; Take 1 capsule by mouth 4 (Four) Times a Day.  Dispense: 40 capsule; Refill: 0    2. Peripheral edema  -     hydroCHLOROthiazide (HYDRODIURIL) 25 MG tablet; Take 1 tablet by mouth Daily.  Dispense: 90 tablet; Refill: 1    3. Essential hypertension  -     lisinopril (PRINIVIL,ZESTRIL) 20 MG tablet; Take 1 tablet by mouth Daily.  Dispense: 90 tablet; Refill: 1  -     CBC w AUTO Differential; Future  -     Comprehensive metabolic panel; Future  -     Lipid panel; Future    4. Gastroesophageal reflux disease without esophagitis  -     omeprazole (priLOSEC) 40 MG capsule; Take 1 capsule by mouth Daily.  Dispense: 90 capsule; Refill: 3    5. Hypercholesteremia  -     pravastatin (PRAVACHOL) 40 MG tablet; Take 1.5 tablets by mouth Daily.  Dispense: 135 tablet; Refill: 1    6. Screening for thyroid disorder  -     TSH; Future  -     T4, free; Future    7. Vitamin D deficiency  -     Vitamin D 25 hydroxy; Future    8. IGT (impaired glucose tolerance)  -     Hemoglobin A1c; Future  -     Insulin, Free & Total, Serum; Future    9. Screening mammogram for breast cancer  -     Mammo screening digital tomosynthesis bilateral w CAD; Future    Other orders  -     Cholecalciferol 100 MCG (4000 UT) capsule; Take 400 Units by  mouth Daily.  Dispense: 90 capsule; Refill: 3  -     Incision & Drainage  -     saccharomyces boulardii (Florastor) 250 MG capsule; Take 1 capsule by mouth 2 (Two) Times a Day.  Dispense: 60 capsule; Refill: 0      Abscess of lower back - new to me. Treatment options are discussed with patient in office today. She is agreeable to incision and drainage today and verbal consent is obtained. Risks, benefits, and alternatives were discussed. Incision and drainage is completed in a sterile fashion Patient tolerated said procedure well and copious serosanguinous drainage was expressed. The patient tolerated said procedure well without complications. Standard post-procedure care explained and return precautions given. Keflex is discontinued for alternate therapy of clindamycin as above. Advised patient to keep affected area clean and dry. Change bandage daily and PRN. RTC in 3 days for recheck or sooner PRN. In office we discussed concerning s/s to immediately rtc or present to ER for recheck. Pt verbalized understanding.     Essential htn - chronic, controlled with lisinopril 20mg daily and hctz 25mg.     GERD - chronic, controlled with prilosec 40mg daily.    Hld - managed with pravastatin 60mg daily. Implementing lifestyle changes of low fat diet.     Patient educated to follow up in 3 days or sooner than next scheduled appointment if symptoms worsen or do not improve. Patient stated understanding and has agreed with plan of care. After visit summary was printed and given to patient.      This document has been electronically signed by Mahi Stapleton PA-C on October 7, 2022 13:56 CDT,.       Incision & Drainage    Date/Time: 10/4/2022 4:02 PM  Performed by: Mahi Stapleton PA-C  Authorized by: Mahi Stapleton PA-C   Type: abscess  Body area: trunk  Anesthesia: local infiltration    Anesthesia:  Local Anesthetic: lidocaine 2% with epinephrine  Anesthetic total: 4 mL  Scalpel size: 10  Needle gauge: 22  Incision type:  single straight  Drainage: serosanguinous  Drainage amount: copious  Wound treatment: wound left open  Packing material: none  Patient tolerance: patient tolerated the procedure well with no immediate complications  Comments: Verbal consent obtained from patient prior to procedure. Risks, benefits, and alternatives were discussed. The area was prepped and draped in the usual, sterile manner. The sit was anesthetized with local infiltration of lidocaine 2% with epinephrine. A linear incision along the local skin lines was made and copious serosanguinous material was expressed. Bleeding was minimal. Bandage applied. The patient tolerated said procedure well without complications. Standard post-procedure care explained and return precautions given.

## 2022-10-07 ENCOUNTER — OFFICE VISIT (OUTPATIENT)
Dept: FAMILY MEDICINE CLINIC | Facility: CLINIC | Age: 52
End: 2022-10-07

## 2022-10-07 VITALS
DIASTOLIC BLOOD PRESSURE: 78 MMHG | WEIGHT: 235 LBS | HEIGHT: 65 IN | HEART RATE: 84 BPM | TEMPERATURE: 98 F | OXYGEN SATURATION: 99 % | BODY MASS INDEX: 39.15 KG/M2 | SYSTOLIC BLOOD PRESSURE: 130 MMHG | RESPIRATION RATE: 18 BRPM

## 2022-10-07 DIAGNOSIS — L02.212 ABSCESS OF LOWER BACK: Primary | ICD-10-CM

## 2022-10-07 PROCEDURE — 99213 OFFICE O/P EST LOW 20 MIN: CPT | Performed by: PHYSICIAN ASSISTANT

## 2022-10-07 NOTE — PROGRESS NOTES
Subjective   Fidelina Dailey is a 51 y.o. female.     History of Present Illness     Pt presents today for a follow up on abscess of lower back. She recently had I&D completed on 10/4/22 and has been taking clindamycin 300mg QID. Today she reports significant improvement in tenderness to affected area. She denies fever, chills, nausea, vomiting, dyspnea, chest pain. She does admit to scant serous drainage to affected area.     The following portions of the patient's history were reviewed and updated as appropriate: allergies, current medications, past family history, past medical history, past social history, past surgical history and problem list.    Review of Systems   Constitutional: Negative.    HENT: Negative.  Negative for congestion.    Eyes: Negative.  Negative for blurred vision, double vision, photophobia and visual disturbance.   Respiratory: Negative.  Negative for cough, shortness of breath, wheezing and stridor.    Cardiovascular: Negative.  Negative for chest pain, palpitations and leg swelling.   Gastrointestinal: Negative.  Negative for abdominal distention, abdominal pain, blood in stool, constipation, diarrhea, nausea, vomiting, GERD and indigestion.   Endocrine: Negative.  Negative for cold intolerance and heat intolerance.   Genitourinary: Negative for dysuria, flank pain, frequency and urinary incontinence.   Musculoskeletal: Negative for arthralgias and back pain.   Skin: Positive for wound.        Abscess lower back   Allergic/Immunologic: Negative.  Negative for immunocompromised state.   Neurological: Negative.    Hematological: Negative.    Psychiatric/Behavioral: Negative.  Negative for agitation, behavioral problems, decreased concentration, dysphoric mood, hallucinations, self-injury, sleep disturbance, suicidal ideas, negative for hyperactivity, depressed mood and stress. The patient is not nervous/anxious.    All other systems reviewed and are negative.      Objective   Physical  Exam  Vitals and nursing note reviewed.   Constitutional:       General: She is not in acute distress.     Appearance: Normal appearance. She is well-developed. She is obese. She is not ill-appearing or diaphoretic.   HENT:      Head: Normocephalic and atraumatic.      Right Ear: External ear normal.      Left Ear: External ear normal.   Eyes:      General: No scleral icterus.        Right eye: No discharge.         Left eye: No discharge.      Conjunctiva/sclera: Conjunctivae normal.      Pupils: Pupils are equal, round, and reactive to light.   Neck:      Thyroid: No thyromegaly.      Vascular: No carotid bruit or JVD.      Trachea: No tracheal deviation.   Cardiovascular:      Rate and Rhythm: Normal rate and regular rhythm.      Pulses: Normal pulses.      Heart sounds: Normal heart sounds. No murmur heard.    No friction rub. No gallop.   Pulmonary:      Effort: Pulmonary effort is normal. No respiratory distress.      Breath sounds: Normal breath sounds. No stridor. No wheezing, rhonchi or rales.   Chest:      Chest wall: No tenderness.   Abdominal:      General: Bowel sounds are normal. There is no distension.      Palpations: Abdomen is soft.      Tenderness: There is no abdominal tenderness. There is no guarding.   Musculoskeletal:         General: No tenderness or deformity. Normal range of motion.      Cervical back: Normal range of motion and neck supple. No rigidity or tenderness.      Right lower leg: No edema.      Left lower leg: No edema.   Lymphadenopathy:      Cervical: No cervical adenopathy.   Skin:     General: Skin is warm and dry.      Capillary Refill: Capillary refill takes 2 to 3 seconds.      Findings: Abscess present.          Neurological:      General: No focal deficit present.      Mental Status: She is alert and oriented to person, place, and time. Mental status is at baseline.      Motor: No weakness.      Gait: Gait normal.   Psychiatric:         Mood and Affect: Mood normal.          Behavior: Behavior normal.         Thought Content: Thought content normal.         Judgment: Judgment normal.       Vitals:    10/07/22 1506   BP: 130/78   Pulse: 84   Resp: 18   Temp: 98 °F (36.7 °C)   SpO2: 99%        Assessment & Plan   Diagnoses and all orders for this visit:    1. Abscess of lower back (Primary)      Abscess of lower back - improving. Continue supportive measures. May use warm compresses to affected area for 20 minutes TID PRN. Continue clindamycin as prescribed until completion. Keep affected area clean and dry. Change bandage daily and PRN. Wash hands before and after changing bandages. In office we discussed concerning s/s to immediately rtc or present to ER for recheck. Pt verbalized understanding. RTC in 1 week for recheck or sooner PRN.     Patient educated to follow up in 1 week or sooner than next scheduled appointment if symptoms worsen or do not improve. Patient stated understanding and has agreed with plan of care. After visit summary was printed and given to patient.      This document has been electronically signed by Mahi Stapleton PA-C on October 7, 2022 16:07 CDT,.

## 2022-10-12 ENCOUNTER — LAB (OUTPATIENT)
Dept: LAB | Facility: OTHER | Age: 52
End: 2022-10-12

## 2022-10-12 DIAGNOSIS — I10 ESSENTIAL HYPERTENSION: ICD-10-CM

## 2022-10-12 DIAGNOSIS — Z13.29 SCREENING FOR THYROID DISORDER: ICD-10-CM

## 2022-10-12 DIAGNOSIS — E55.9 VITAMIN D DEFICIENCY: ICD-10-CM

## 2022-10-12 DIAGNOSIS — R73.02 IGT (IMPAIRED GLUCOSE TOLERANCE): ICD-10-CM

## 2022-10-12 LAB
25(OH)D3 SERPL-MCNC: 83.2 NG/ML (ref 30–100)
ALBUMIN SERPL-MCNC: 4.2 G/DL (ref 3.5–5)
ALBUMIN/GLOB SERPL: 1.3 G/DL (ref 1.1–1.8)
ALP SERPL-CCNC: 77 U/L (ref 38–126)
ALT SERPL W P-5'-P-CCNC: 22 U/L
ANION GAP SERPL CALCULATED.3IONS-SCNC: 6 MMOL/L (ref 5–15)
AST SERPL-CCNC: 22 U/L (ref 14–36)
BASOPHILS # BLD AUTO: 0.04 10*3/MM3 (ref 0–0.2)
BASOPHILS NFR BLD AUTO: 0.5 % (ref 0–1.5)
BILIRUB SERPL-MCNC: 0.4 MG/DL (ref 0.2–1.3)
BUN SERPL-MCNC: 24 MG/DL (ref 7–23)
BUN/CREAT SERPL: 32.4 (ref 7–25)
CALCIUM SPEC-SCNC: 9.7 MG/DL (ref 8.4–10.2)
CHLORIDE SERPL-SCNC: 103 MMOL/L (ref 101–112)
CHOLEST SERPL-MCNC: 172 MG/DL (ref 150–200)
CO2 SERPL-SCNC: 31 MMOL/L (ref 22–30)
CREAT SERPL-MCNC: 0.74 MG/DL (ref 0.52–1.04)
DEPRECATED RDW RBC AUTO: 48.4 FL (ref 37–54)
EGFRCR SERPLBLD CKD-EPI 2021: 98.1 ML/MIN/1.73
EOSINOPHIL # BLD AUTO: 0.23 10*3/MM3 (ref 0–0.4)
EOSINOPHIL NFR BLD AUTO: 2.8 % (ref 0.3–6.2)
ERYTHROCYTE [DISTWIDTH] IN BLOOD BY AUTOMATED COUNT: 14.7 % (ref 12.3–15.4)
GLOBULIN UR ELPH-MCNC: 3.2 GM/DL (ref 2.3–3.5)
GLUCOSE SERPL-MCNC: 92 MG/DL (ref 70–99)
HBA1C MFR BLD: 5.6 % (ref 4.8–5.6)
HCT VFR BLD AUTO: 37.6 % (ref 34–46.6)
HDLC SERPL-MCNC: 46 MG/DL (ref 40–59)
HGB BLD-MCNC: 11.9 G/DL (ref 12–15.9)
LDLC SERPL CALC-MCNC: 106 MG/DL
LDLC/HDLC SERPL: 2.26 {RATIO} (ref 0–3.22)
LYMPHOCYTES # BLD AUTO: 1.86 10*3/MM3 (ref 0.7–3.1)
LYMPHOCYTES NFR BLD AUTO: 22.6 % (ref 19.6–45.3)
MCH RBC QN AUTO: 30 PG (ref 26.6–33)
MCHC RBC AUTO-ENTMCNC: 31.6 G/DL (ref 31.5–35.7)
MCV RBC AUTO: 94.7 FL (ref 79–97)
MONOCYTES # BLD AUTO: 0.57 10*3/MM3 (ref 0.1–0.9)
MONOCYTES NFR BLD AUTO: 6.9 % (ref 5–12)
NEUTROPHILS NFR BLD AUTO: 5.54 10*3/MM3 (ref 1.7–7)
NEUTROPHILS NFR BLD AUTO: 67.2 % (ref 42.7–76)
PLATELET # BLD AUTO: 290 10*3/MM3 (ref 140–450)
PMV BLD AUTO: 11.6 FL (ref 6–12)
POTASSIUM SERPL-SCNC: 3.7 MMOL/L (ref 3.4–5)
PROT SERPL-MCNC: 7.4 G/DL (ref 6.3–8.6)
RBC # BLD AUTO: 3.97 10*6/MM3 (ref 3.77–5.28)
SODIUM SERPL-SCNC: 140 MMOL/L (ref 137–145)
T4 FREE SERPL-MCNC: 1.42 NG/DL (ref 0.93–1.7)
TRIGL SERPL-MCNC: 111 MG/DL
TSH SERPL DL<=0.05 MIU/L-ACNC: 1.4 UIU/ML (ref 0.27–4.2)
VLDLC SERPL-MCNC: 20 MG/DL (ref 5–40)
WBC NRBC COR # BLD: 8.24 10*3/MM3 (ref 3.4–10.8)

## 2022-10-12 PROCEDURE — 80061 LIPID PANEL: CPT | Performed by: PHYSICIAN ASSISTANT

## 2022-10-12 PROCEDURE — 36415 COLL VENOUS BLD VENIPUNCTURE: CPT

## 2022-10-12 PROCEDURE — 82306 VITAMIN D 25 HYDROXY: CPT | Performed by: PHYSICIAN ASSISTANT

## 2022-10-12 PROCEDURE — 80050 GENERAL HEALTH PANEL: CPT | Performed by: PHYSICIAN ASSISTANT

## 2022-10-12 PROCEDURE — 84439 ASSAY OF FREE THYROXINE: CPT | Performed by: PHYSICIAN ASSISTANT

## 2022-10-12 PROCEDURE — 83527 ASSAY OF INSULIN: CPT | Performed by: PHYSICIAN ASSISTANT

## 2022-10-12 PROCEDURE — 83036 HEMOGLOBIN GLYCOSYLATED A1C: CPT | Performed by: PHYSICIAN ASSISTANT

## 2022-10-12 PROCEDURE — 83525 ASSAY OF INSULIN: CPT | Performed by: PHYSICIAN ASSISTANT

## 2022-10-14 ENCOUNTER — OFFICE VISIT (OUTPATIENT)
Dept: FAMILY MEDICINE CLINIC | Facility: CLINIC | Age: 52
End: 2022-10-14

## 2022-10-14 VITALS
HEIGHT: 65 IN | WEIGHT: 237 LBS | BODY MASS INDEX: 39.49 KG/M2 | SYSTOLIC BLOOD PRESSURE: 126 MMHG | OXYGEN SATURATION: 99 % | HEART RATE: 63 BPM | TEMPERATURE: 98.4 F | DIASTOLIC BLOOD PRESSURE: 74 MMHG

## 2022-10-14 DIAGNOSIS — L02.212 ABSCESS OF LOWER BACK: Primary | ICD-10-CM

## 2022-10-14 PROCEDURE — 99213 OFFICE O/P EST LOW 20 MIN: CPT | Performed by: PHYSICIAN ASSISTANT

## 2022-10-14 NOTE — PROGRESS NOTES
Subjective   Fidelina Dailey is a 51 y.o. female.     History of Present Illness     Pt presents today for a follow up on abscess of lower back. She recently had I&D completed on 10/4/22 and has been taking clindamycin 300mg QID. Today she reports improvement in tenderness to affected area. Does admit to continued induration and mild pain to affected area. She denies fever, chills, nausea, vomiting, dyspnea, chest pain. States has approximately a few days remaining days of clindamycin. Denies discharge or drainage to affected area.    The following portions of the patient's history were reviewed and updated as appropriate: allergies, current medications, past family history, past medical history, past social history, past surgical history and problem list.    Review of Systems   Constitutional: Negative.    HENT: Negative.  Negative for congestion.    Eyes: Negative.  Negative for blurred vision, double vision, photophobia and visual disturbance.   Respiratory: Negative.  Negative for cough, shortness of breath, wheezing and stridor.    Cardiovascular: Negative.  Negative for chest pain, palpitations and leg swelling.   Gastrointestinal: Negative.  Negative for abdominal distention, abdominal pain, blood in stool, constipation, diarrhea, nausea, vomiting, GERD and indigestion.   Endocrine: Negative.  Negative for cold intolerance and heat intolerance.   Genitourinary: Negative for dysuria, flank pain, frequency and urinary incontinence.   Musculoskeletal: Negative for arthralgias and back pain.   Skin:        Abscess lower back   Allergic/Immunologic: Negative.  Negative for immunocompromised state.   Neurological: Negative.    Hematological: Negative.    Psychiatric/Behavioral: Negative.  Negative for agitation, behavioral problems, decreased concentration, dysphoric mood, hallucinations, self-injury, sleep disturbance, suicidal ideas, negative for hyperactivity, depressed mood and stress. The patient is not  nervous/anxious.    All other systems reviewed and are negative.      Objective   Physical Exam  Vitals and nursing note reviewed.   Constitutional:       General: She is not in acute distress.     Appearance: Normal appearance. She is well-developed. She is obese. She is not ill-appearing or diaphoretic.   HENT:      Head: Normocephalic and atraumatic.      Right Ear: External ear normal.      Left Ear: External ear normal.   Eyes:      General: No scleral icterus.        Right eye: No discharge.         Left eye: No discharge.      Conjunctiva/sclera: Conjunctivae normal.      Pupils: Pupils are equal, round, and reactive to light.   Neck:      Thyroid: No thyromegaly.      Vascular: No carotid bruit or JVD.      Trachea: No tracheal deviation.   Cardiovascular:      Rate and Rhythm: Normal rate and regular rhythm.      Pulses: Normal pulses.      Heart sounds: Normal heart sounds. No murmur heard.    No friction rub. No gallop.   Pulmonary:      Effort: Pulmonary effort is normal. No respiratory distress.      Breath sounds: Normal breath sounds. No stridor. No wheezing, rhonchi or rales.   Chest:      Chest wall: No tenderness.   Abdominal:      General: Bowel sounds are normal. There is no distension.      Palpations: Abdomen is soft.      Tenderness: There is no abdominal tenderness. There is no guarding.   Musculoskeletal:         General: No tenderness or deformity. Normal range of motion.      Cervical back: Normal range of motion and neck supple. No rigidity or tenderness.      Right lower leg: No edema.      Left lower leg: No edema.   Lymphadenopathy:      Cervical: No cervical adenopathy.   Skin:     General: Skin is warm and dry.      Capillary Refill: Capillary refill takes 2 to 3 seconds.      Findings: Abscess present.          Neurological:      General: No focal deficit present.      Mental Status: She is alert and oriented to person, place, and time. Mental status is at baseline.      Motor: No  weakness.      Gait: Gait normal.   Psychiatric:         Mood and Affect: Mood normal.         Behavior: Behavior normal.         Thought Content: Thought content normal.         Judgment: Judgment normal.       Vitals:    10/14/22 0821   BP: 126/74   Pulse: 63   Temp: 98.4 °F (36.9 °C)   SpO2: 99%        Assessment & Plan   Diagnoses and all orders for this visit:    1. Abscess of lower back (Primary)  -     Ambulatory Referral to General Surgery      Will refer patient to general surgery at [facility] for further evaluation and assessment of abscess, consideration of I&D. Continue supportive measures. May use warm compresses to affected area for 20 minutes TID PRN. Continue clindamycin as prescribed until completion. Keep affected area clean and dry. Change bandage daily and PRN. Wash hands before and after changing bandages. In office we discussed concerning s/s to immediately rtc or present to ER for recheck. Pt verbalized understanding.     Patient educated to follow up sooner than next scheduled appointment if symptoms worsen or do not improve. Patient stated understanding and has agreed with plan of care. After visit summary was printed and given to patient.      This document has been electronically signed by Mahi Stapleton PA-C on October 14, 2022 08:51 CDT,.

## 2022-10-17 LAB
INSULIN FREE SERPL-ACNC: 6.2 UU/ML
INSULIN SERPL-ACNC: 6.2 UU/ML

## 2022-10-25 DIAGNOSIS — R53.83 FATIGUE, UNSPECIFIED TYPE: Primary | ICD-10-CM

## 2022-11-11 ENCOUNTER — LAB (OUTPATIENT)
Dept: LAB | Facility: OTHER | Age: 52
End: 2022-11-11

## 2022-11-11 DIAGNOSIS — R53.83 FATIGUE, UNSPECIFIED TYPE: ICD-10-CM

## 2022-11-11 LAB
FOLATE SERPL-MCNC: >20 NG/ML (ref 4.78–24.2)
VIT B12 BLD-MCNC: 1055 PG/ML (ref 211–946)

## 2022-11-11 PROCEDURE — 82746 ASSAY OF FOLIC ACID SERUM: CPT | Performed by: PHYSICIAN ASSISTANT

## 2022-11-11 PROCEDURE — 82607 VITAMIN B-12: CPT | Performed by: PHYSICIAN ASSISTANT

## 2022-11-11 PROCEDURE — 36415 COLL VENOUS BLD VENIPUNCTURE: CPT | Performed by: PHYSICIAN ASSISTANT

## 2022-12-05 DIAGNOSIS — E78.00 HYPERCHOLESTEREMIA: ICD-10-CM

## 2022-12-05 RX ORDER — PRAVASTATIN SODIUM 40 MG
60 TABLET ORAL DAILY
Qty: 135 TABLET | Refills: 1 | OUTPATIENT
Start: 2022-12-05

## 2023-02-27 DIAGNOSIS — R60.9 PERIPHERAL EDEMA: ICD-10-CM

## 2023-02-27 RX ORDER — HYDROCHLOROTHIAZIDE 25 MG/1
25 TABLET ORAL DAILY
Qty: 30 TABLET | Refills: 0 | Status: SHIPPED | OUTPATIENT
Start: 2023-02-27 | End: 2023-03-16

## 2023-03-15 DIAGNOSIS — R60.9 PERIPHERAL EDEMA: ICD-10-CM

## 2023-03-15 DIAGNOSIS — E78.00 HYPERCHOLESTEREMIA: ICD-10-CM

## 2023-03-16 RX ORDER — HYDROCHLOROTHIAZIDE 25 MG/1
25 TABLET ORAL DAILY
Qty: 30 TABLET | Refills: 0 | Status: SHIPPED | OUTPATIENT
Start: 2023-03-16

## 2023-03-16 RX ORDER — PRAVASTATIN SODIUM 40 MG
60 TABLET ORAL DAILY
Qty: 45 TABLET | Refills: 0 | Status: SHIPPED | OUTPATIENT
Start: 2023-03-16

## 2023-04-11 ENCOUNTER — TELEPHONE (OUTPATIENT)
Dept: FAMILY MEDICINE CLINIC | Facility: CLINIC | Age: 53
End: 2023-04-11
Payer: COMMERCIAL

## 2023-04-11 DIAGNOSIS — I10 ESSENTIAL HYPERTENSION: Chronic | ICD-10-CM

## 2023-04-11 RX ORDER — LISINOPRIL 20 MG/1
20 TABLET ORAL DAILY
Qty: 30 TABLET | Refills: 0 | Status: SHIPPED | OUTPATIENT
Start: 2023-04-11 | End: 2023-04-21 | Stop reason: SDUPTHER

## 2023-04-11 NOTE — TELEPHONE ENCOUNTER
lisinopril (PRINIVIL,ZESTRIL) 20 MG tablet    [facility] IN [place]    SHE WILL BE OUT PATIENT SHE HAS AN APPOINTMENT BUT IS OUT OF [facility]

## 2023-04-21 ENCOUNTER — OFFICE VISIT (OUTPATIENT)
Dept: FAMILY MEDICINE CLINIC | Facility: CLINIC | Age: 53
End: 2023-04-21
Payer: COMMERCIAL

## 2023-04-21 ENCOUNTER — LAB (OUTPATIENT)
Dept: LAB | Facility: OTHER | Age: 53
End: 2023-04-21
Payer: COMMERCIAL

## 2023-04-21 VITALS
TEMPERATURE: 97.4 F | WEIGHT: 239 LBS | HEIGHT: 65 IN | OXYGEN SATURATION: 98 % | SYSTOLIC BLOOD PRESSURE: 110 MMHG | BODY MASS INDEX: 39.82 KG/M2 | HEART RATE: 56 BPM | DIASTOLIC BLOOD PRESSURE: 64 MMHG

## 2023-04-21 DIAGNOSIS — E55.9 VITAMIN D DEFICIENCY: Primary | ICD-10-CM

## 2023-04-21 DIAGNOSIS — R60.9 PERIPHERAL EDEMA: ICD-10-CM

## 2023-04-21 DIAGNOSIS — I10 ESSENTIAL HYPERTENSION: Chronic | ICD-10-CM

## 2023-04-21 DIAGNOSIS — R73.02 IGT (IMPAIRED GLUCOSE TOLERANCE): ICD-10-CM

## 2023-04-21 DIAGNOSIS — E78.00 HYPERCHOLESTEREMIA: ICD-10-CM

## 2023-04-21 DIAGNOSIS — E53.8 B12 DEFICIENCY: ICD-10-CM

## 2023-04-21 DIAGNOSIS — E55.9 VITAMIN D DEFICIENCY: ICD-10-CM

## 2023-04-21 DIAGNOSIS — F41.1 GENERALIZED ANXIETY DISORDER: ICD-10-CM

## 2023-04-21 DIAGNOSIS — I10 ESSENTIAL HYPERTENSION: ICD-10-CM

## 2023-04-21 LAB
ALBUMIN SERPL-MCNC: 4 G/DL (ref 3.5–5)
ALBUMIN/GLOB SERPL: 1.2 G/DL (ref 1.1–1.8)
ALP SERPL-CCNC: 75 U/L (ref 38–126)
ALT SERPL W P-5'-P-CCNC: 28 U/L
ANION GAP SERPL CALCULATED.3IONS-SCNC: 6 MMOL/L (ref 5–15)
AST SERPL-CCNC: 25 U/L (ref 14–36)
BACTERIA UR QL AUTO: ABNORMAL /HPF
BASOPHILS # BLD AUTO: 0.04 10*3/MM3 (ref 0–0.2)
BASOPHILS NFR BLD AUTO: 0.6 % (ref 0–1.5)
BILIRUB SERPL-MCNC: 0.5 MG/DL (ref 0.2–1.3)
BILIRUB UR QL STRIP: NEGATIVE
BUN SERPL-MCNC: 15 MG/DL (ref 7–23)
BUN/CREAT SERPL: 19.2 (ref 7–25)
CALCIUM SPEC-SCNC: 9.8 MG/DL (ref 8.4–10.2)
CHLORIDE SERPL-SCNC: 103 MMOL/L (ref 101–112)
CHOLEST SERPL-MCNC: 194 MG/DL (ref 150–200)
CLARITY UR: CLEAR
CO2 SERPL-SCNC: 30 MMOL/L (ref 22–30)
COLOR UR: YELLOW
CREAT SERPL-MCNC: 0.78 MG/DL (ref 0.52–1.04)
DEPRECATED RDW RBC AUTO: 47.8 FL (ref 37–54)
EGFRCR SERPLBLD CKD-EPI 2021: 91.5 ML/MIN/1.73
EOSINOPHIL # BLD AUTO: 0.28 10*3/MM3 (ref 0–0.4)
EOSINOPHIL NFR BLD AUTO: 4.1 % (ref 0.3–6.2)
ERYTHROCYTE [DISTWIDTH] IN BLOOD BY AUTOMATED COUNT: 14.1 % (ref 12.3–15.4)
GLOBULIN UR ELPH-MCNC: 3.3 GM/DL (ref 2.3–3.5)
GLUCOSE SERPL-MCNC: 89 MG/DL (ref 70–99)
GLUCOSE UR STRIP-MCNC: NEGATIVE MG/DL
HCT VFR BLD AUTO: 38.6 % (ref 34–46.6)
HDLC SERPL-MCNC: 46 MG/DL (ref 40–59)
HGB BLD-MCNC: 12.6 G/DL (ref 12–15.9)
HGB UR QL STRIP.AUTO: ABNORMAL
HYALINE CASTS UR QL AUTO: ABNORMAL /LPF
KETONES UR QL STRIP: NEGATIVE
LDLC SERPL CALC-MCNC: 123 MG/DL
LDLC/HDLC SERPL: 2.61 {RATIO} (ref 0–3.22)
LEUKOCYTE ESTERASE UR QL STRIP.AUTO: ABNORMAL
LYMPHOCYTES # BLD AUTO: 2.09 10*3/MM3 (ref 0.7–3.1)
LYMPHOCYTES NFR BLD AUTO: 30.4 % (ref 19.6–45.3)
MCH RBC QN AUTO: 31.3 PG (ref 26.6–33)
MCHC RBC AUTO-ENTMCNC: 32.6 G/DL (ref 31.5–35.7)
MCV RBC AUTO: 96 FL (ref 79–97)
MONOCYTES # BLD AUTO: 0.53 10*3/MM3 (ref 0.1–0.9)
MONOCYTES NFR BLD AUTO: 7.7 % (ref 5–12)
NEUTROPHILS NFR BLD AUTO: 3.93 10*3/MM3 (ref 1.7–7)
NEUTROPHILS NFR BLD AUTO: 57.2 % (ref 42.7–76)
NITRITE UR QL STRIP: NEGATIVE
PH UR STRIP.AUTO: 5.5 [PH] (ref 5.5–8)
PLATELET # BLD AUTO: 259 10*3/MM3 (ref 140–450)
PMV BLD AUTO: 11.4 FL (ref 6–12)
POTASSIUM SERPL-SCNC: 3.5 MMOL/L (ref 3.4–5)
PROT SERPL-MCNC: 7.3 G/DL (ref 6.3–8.6)
PROT UR QL STRIP: NEGATIVE
RBC # BLD AUTO: 4.02 10*6/MM3 (ref 3.77–5.28)
RBC # UR STRIP: ABNORMAL /HPF
REF LAB TEST METHOD: ABNORMAL
SODIUM SERPL-SCNC: 139 MMOL/L (ref 137–145)
SP GR UR STRIP: 1.02 (ref 1–1.03)
SQUAMOUS #/AREA URNS HPF: ABNORMAL /HPF
TRIGL SERPL-MCNC: 139 MG/DL
UROBILINOGEN UR QL STRIP: ABNORMAL
VLDLC SERPL-MCNC: 25 MG/DL (ref 5–40)
WBC # UR STRIP: ABNORMAL /HPF
WBC NRBC COR # BLD: 6.87 10*3/MM3 (ref 3.4–10.8)

## 2023-04-21 PROCEDURE — 36415 COLL VENOUS BLD VENIPUNCTURE: CPT | Performed by: NURSE PRACTITIONER

## 2023-04-21 PROCEDURE — 80050 GENERAL HEALTH PANEL: CPT | Performed by: NURSE PRACTITIONER

## 2023-04-21 PROCEDURE — 82306 VITAMIN D 25 HYDROXY: CPT | Performed by: NURSE PRACTITIONER

## 2023-04-21 PROCEDURE — 82043 UR ALBUMIN QUANTITATIVE: CPT | Performed by: NURSE PRACTITIONER

## 2023-04-21 PROCEDURE — 81001 URINALYSIS AUTO W/SCOPE: CPT | Performed by: NURSE PRACTITIONER

## 2023-04-21 PROCEDURE — 83036 HEMOGLOBIN GLYCOSYLATED A1C: CPT | Performed by: NURSE PRACTITIONER

## 2023-04-21 PROCEDURE — 80061 LIPID PANEL: CPT | Performed by: NURSE PRACTITIONER

## 2023-04-21 PROCEDURE — 84439 ASSAY OF FREE THYROXINE: CPT | Performed by: NURSE PRACTITIONER

## 2023-04-21 PROCEDURE — 82607 VITAMIN B-12: CPT | Performed by: NURSE PRACTITIONER

## 2023-04-21 PROCEDURE — 82570 ASSAY OF URINE CREATININE: CPT | Performed by: NURSE PRACTITIONER

## 2023-04-21 PROCEDURE — 99213 OFFICE O/P EST LOW 20 MIN: CPT | Performed by: NURSE PRACTITIONER

## 2023-04-21 RX ORDER — HYDROCHLOROTHIAZIDE 25 MG/1
25 TABLET ORAL DAILY
Qty: 90 TABLET | Refills: 0 | Status: SHIPPED | OUTPATIENT
Start: 2023-04-21

## 2023-04-21 RX ORDER — BUSPIRONE HYDROCHLORIDE 5 MG/1
5 TABLET ORAL 2 TIMES DAILY PRN
Qty: 60 TABLET | Refills: 1 | Status: SHIPPED | OUTPATIENT
Start: 2023-04-21

## 2023-04-21 RX ORDER — PRAVASTATIN SODIUM 40 MG
60 TABLET ORAL DAILY
Qty: 45 TABLET | Refills: 2 | Status: SHIPPED | OUTPATIENT
Start: 2023-04-21

## 2023-04-21 RX ORDER — LISINOPRIL 20 MG/1
20 TABLET ORAL DAILY
Qty: 90 TABLET | Refills: 0 | Status: SHIPPED | OUTPATIENT
Start: 2023-04-21

## 2023-04-21 NOTE — PROGRESS NOTES
Subjective   Fidelina Dailey is a 52 y.o. female who presents to the office for HTN, hypercholesterolemia and edema follow-up.  Had gastric bypass surgery with Dr. Amrit Pickard in 2016.  Max weight was 314 while lowest was 195.  Denies thyroid issues.  Tells me that she does experience anxiety even in daily activities, occurs every few days.  Denies acute depression. Denies HI/SI.  Denies current counseling but is agreeable to seeing someone.  History of Present Illness     The following portions of the patient's history were reviewed and updated as appropriate: allergies, current medications, past family history, past medical history, past social history, past surgical history and problem list.    Review of Systems   Constitutional: Negative for chills, fatigue and fever.   HENT: Negative for congestion, sneezing, sore throat and trouble swallowing.    Eyes: Negative for visual disturbance.   Respiratory: Negative for cough, chest tightness, shortness of breath and wheezing.    Cardiovascular: Negative for chest pain, palpitations and leg swelling.   Gastrointestinal: Negative for abdominal pain, constipation, diarrhea, nausea and vomiting.   Genitourinary: Negative for dysuria, frequency and urgency.   Musculoskeletal: Negative for neck pain.   Skin: Negative for rash.   Neurological: Negative for dizziness, weakness and headaches.   Psychiatric/Behavioral: Negative for suicidal ideas. The patient is nervous/anxious.         In the past two weeks the pt has not felt down, depressed, hopeless or lost interest in doing things   All other systems reviewed and are negative.      Objective   Physical Exam  Vitals and nursing note reviewed.   Constitutional:       General: She is not in acute distress.     Appearance: She is well-developed. She is obese. She is not ill-appearing.   HENT:      Head: Normocephalic and atraumatic.      Right Ear: External ear normal.      Left Ear: External ear normal.      Nose: Nose  normal.   Eyes:      General: No scleral icterus.        Right eye: No discharge.         Left eye: No discharge.      Conjunctiva/sclera: Conjunctivae normal.      Pupils: Pupils are equal, round, and reactive to light.   Neck:      Thyroid: No thyromegaly.   Cardiovascular:      Rate and Rhythm: Normal rate and regular rhythm.      Heart sounds: Normal heart sounds. No murmur heard.    No friction rub. No gallop.   Pulmonary:      Effort: Pulmonary effort is normal. No respiratory distress.      Breath sounds: Normal breath sounds. No wheezing or rales.   Abdominal:      General: Bowel sounds are normal. There is no distension.      Palpations: Abdomen is soft.      Tenderness: There is no abdominal tenderness. There is no guarding or rebound.   Musculoskeletal:         General: Normal range of motion.      Cervical back: Normal range of motion and neck supple.   Lymphadenopathy:      Cervical: No cervical adenopathy.   Skin:     General: Skin is warm and dry.      Capillary Refill: Capillary refill takes less than 2 seconds.      Findings: No rash.   Neurological:      General: No focal deficit present.      Mental Status: She is alert and oriented to person, place, and time.      GCS: GCS eye subscore is 4. GCS verbal subscore is 5. GCS motor subscore is 6.      Cranial Nerves: Cranial nerves 2-12 are intact. No cranial nerve deficit.      Sensory: Sensation is intact.      Motor: Motor function is intact.      Coordination: Coordination is intact.      Gait: Gait is intact.   Psychiatric:         Attention and Perception: Attention and perception normal.         Mood and Affect: Mood and affect normal.         Speech: Speech normal.         Behavior: Behavior normal. Behavior is cooperative.         Thought Content: Thought content normal. Thought content does not include homicidal or suicidal ideation. Thought content does not include homicidal or suicidal plan.         Cognition and Memory: Cognition and  memory normal.         Judgment: Judgment normal.      Comments: Dressed appropriately for weather and situation, makes eye contact and engages in conversation         Assessment & Plan   Diagnoses and all orders for this visit:    1. Vitamin D deficiency (Primary)  -     Vitamin D,25-Hydroxy; Future    2. Peripheral edema  -     hydroCHLOROthiazide (HYDRODIURIL) 25 MG tablet; Take 1 tablet by mouth Daily.  Dispense: 90 tablet; Refill: 0    3. Hypercholesteremia  -     Lipid Panel; Future    4. Essential hypertension  -     lisinopril (PRINIVIL,ZESTRIL) 20 MG tablet; Take 1 tablet by mouth Daily.  Dispense: 90 tablet; Refill: 0  -     CBC & Differential; Future  -     Comprehensive Metabolic Panel; Future  -     T4, Free; Future  -     TSH; Future  -     Urinalysis With Culture If Indicated -; Future  -     Lipid Panel; Future  -     Microalbumin / Creatinine Urine Ratio - Urine, Clean Catch; Future    5. B12 deficiency  -     Vitamin B12; Future    6. IGT (impaired glucose tolerance)  -     Hemoglobin A1c; Future    7. Generalized anxiety disorder  -     Ambulatory Referral to Behavioral Health    Other orders  -     busPIRone (BUSPAR) 5 MG tablet; Take 1 tablet by mouth 2 (Two) Times a Day As Needed (anxiety).  Dispense: 60 tablet; Refill: 1    Will start her on Buspar, sending in to [facility].    Referral to counseling generated, hopefully we can find someone fairly local for her.  Updating fasting labs.  Reviewed [facility]# [id number].             This document has been electronically signed by EDITH Keyes on April 30, 2023 07:14 CDT

## 2023-04-21 NOTE — TELEPHONE ENCOUNTER
Patient seen by EDITH Keyes today 4/21/2023. Per provider she will be covering with 30 day supply and two refills as patient was previously seen by Mahi Stapleton PA-C.

## 2023-04-22 LAB
25(OH)D3 SERPL-MCNC: 54.1 NG/ML (ref 30–100)
ALBUMIN UR-MCNC: 2.2 MG/DL
CREAT UR-MCNC: 83.9 MG/DL
HBA1C MFR BLD: 5.2 % (ref 4.8–5.6)
MICROALBUMIN/CREAT UR: 26.2 MG/G
T4 FREE SERPL-MCNC: 1.26 NG/DL (ref 0.93–1.7)
TSH SERPL DL<=0.05 MIU/L-ACNC: 0.99 UIU/ML (ref 0.27–4.2)
VIT B12 BLD-MCNC: 1866 PG/ML (ref 211–946)

## 2023-05-02 ENCOUNTER — TELEPHONE (OUTPATIENT)
Dept: FAMILY MEDICINE CLINIC | Facility: CLINIC | Age: 53
End: 2023-05-02
Payer: COMMERCIAL

## 2023-05-02 NOTE — TELEPHONE ENCOUNTER
Abi Munson MA   5/2/2023  5:33 PM CDT  Contacted the patient and informed her of the message. She stated understanding and thanked me.    Abi Munson MA   5/1/2023 12:46 PM CDT   Attempted to contact the patient, no answer. Left voice message to contact the office when possible.     Michelle Servin, EDITH   4/29/2023  9:29 AM CDT   Pls inform labs are stable.

## 2023-06-06 RX ORDER — BUSPIRONE HYDROCHLORIDE 5 MG/1
TABLET ORAL
Qty: 60 TABLET | Refills: 0 | Status: SHIPPED | OUTPATIENT
Start: 2023-06-06

## 2023-07-24 DIAGNOSIS — E78.00 HYPERCHOLESTEREMIA: ICD-10-CM

## 2023-07-24 RX ORDER — PRAVASTATIN SODIUM 40 MG
60 TABLET ORAL DAILY
Qty: 45 TABLET | Refills: 2 | Status: SHIPPED | OUTPATIENT
Start: 2023-07-24

## 2023-07-24 NOTE — TELEPHONE ENCOUNTER
Rx Refill Note  Requested Prescriptions     Pending Prescriptions Disp Refills    pravastatin (PRAVACHOL) 40 MG tablet [Pharmacy Med Name: pravastatin 40 mg tablet] 45 tablet 2     Sig: take 1.5 tablets BY MOUTH daily      Last office visit with prescribing clinician: 4/21/2023   Last telemedicine visit with prescribing clinician: Visit date not found   Next office visit with prescribing clinician: Visit date not found       Marcial Jordan RN  07/24/23, 15:11 CDT

## 2023-08-17 DIAGNOSIS — L29.2 VULVAR ITCHING: ICD-10-CM

## 2023-08-17 DIAGNOSIS — I10 ESSENTIAL HYPERTENSION: Chronic | ICD-10-CM

## 2023-08-17 RX ORDER — LISINOPRIL 20 MG/1
20 TABLET ORAL DAILY
Qty: 90 TABLET | Refills: 0 | Status: SHIPPED | OUTPATIENT
Start: 2023-08-17

## 2023-09-27 DIAGNOSIS — R60.9 PERIPHERAL EDEMA: ICD-10-CM

## 2023-09-27 RX ORDER — HYDROCHLOROTHIAZIDE 25 MG/1
25 TABLET ORAL DAILY
Qty: 30 TABLET | Refills: 0 | Status: SHIPPED | OUTPATIENT
Start: 2023-09-27

## 2023-09-27 RX ORDER — BUSPIRONE HYDROCHLORIDE 5 MG/1
TABLET ORAL
Qty: 60 TABLET | Refills: 0 | Status: SHIPPED | OUTPATIENT
Start: 2023-09-27